# Patient Record
Sex: FEMALE | Race: WHITE | NOT HISPANIC OR LATINO | Employment: OTHER | URBAN - METROPOLITAN AREA
[De-identification: names, ages, dates, MRNs, and addresses within clinical notes are randomized per-mention and may not be internally consistent; named-entity substitution may affect disease eponyms.]

---

## 2017-01-04 ENCOUNTER — HOSPITAL ENCOUNTER (OUTPATIENT)
Dept: PHYSICAL THERAPY | Facility: HOSPITAL | Age: 67
Setting detail: RECURRING SERIES
Discharge: HOME | End: 2017-01-26
Attending: NURSE PRACTITIONER | Admitting: NURSE PRACTITIONER

## 2017-01-10 ENCOUNTER — HOSPITAL ENCOUNTER (OUTPATIENT)
Dept: CARDIOLOGY | Facility: HOSPITAL | Age: 67
Discharge: HOME | End: 2017-01-10
Attending: INTERNAL MEDICINE | Admitting: INTERNAL MEDICINE

## 2017-01-23 ENCOUNTER — HOSPITAL ENCOUNTER (OUTPATIENT)
Dept: CT IMAGING | Facility: HOSPITAL | Age: 67
Discharge: HOME | End: 2017-01-23
Attending: INTERNAL MEDICINE | Admitting: INTERNAL MEDICINE

## 2017-02-09 ENCOUNTER — HOSPITAL ENCOUNTER (OUTPATIENT)
Dept: CARDIOLOGY | Facility: HOSPITAL | Age: 67
Discharge: HOME | End: 2017-02-09
Attending: INTERNAL MEDICINE | Admitting: INTERNAL MEDICINE

## 2017-02-16 ENCOUNTER — HOSPITAL ENCOUNTER (OUTPATIENT)
Dept: OTHER | Facility: HOSPITAL | Age: 67
Discharge: HOME | End: 2017-02-16
Attending: INTERNAL MEDICINE | Admitting: INTERNAL MEDICINE

## 2017-02-16 LAB
ANION GAP SERPL CALC-SCNC: 13 MMOL/L (ref 10–20)
APTT BLD: 33.8 SEC (ref 24–31)
BASOPHILS # BLD AUTO: 0 10*3/UL (ref 0–0.2)
BASOPHILS NFR BLD AUTO: 1 % (ref 0–2)
BUN SERPL-MCNC: 11 MG/DL (ref 8–20)
BUN/CREAT SERPL: 12.2 (ref 5.4–26.2)
CALCIUM SERPL-MCNC: 9.3 MG/DL (ref 8.9–10.3)
CHLORIDE SERPL-SCNC: 108 MMOL/L (ref 101–111)
CHOLEST SERPL-MCNC: 163 MG/DL
CHOLEST/HDLC SERPL: 3.2 {RATIO}
CONV CO2: 26 MMOL/L (ref 22–32)
CONV LDL CHOLESTEROL DIRECT: 84 MG/DL (ref 0–100)
CREAT UR-MCNC: 0.9 MG/DL (ref 0.4–1)
DIFFERENTIAL METHOD BLD: (no result)
EOSINOPHIL # BLD AUTO: 0.3 10*3/UL (ref 0–0.3)
EOSINOPHIL # BLD AUTO: 6 % (ref 0–3)
ERYTHROCYTE [DISTWIDTH] IN BLOOD BY AUTOMATED COUNT: 14.4 % (ref 11.5–14.5)
GLUCOSE SERPL-MCNC: 98 MG/DL (ref 65–99)
HCT VFR BLD AUTO: 38.1 % (ref 35–49)
HDLC SERPL-MCNC: 51 MG/DL
HGB BLD-MCNC: 12.6 G/DL (ref 12–15)
INR PPP: 1.5
LDLC/HDLC SERPL: 1.6 {RATIO}
LIPID INTERPRETATION: ABNORMAL
LYMPHOCYTES # BLD AUTO: 1.8 10*3/UL (ref 0.8–4.8)
LYMPHOCYTES NFR BLD AUTO: 40 % (ref 18–42)
MCH RBC QN AUTO: 28.3 PG (ref 26–32)
MCHC RBC AUTO-ENTMCNC: 33.1 G/DL (ref 32–36)
MCV RBC AUTO: 85.5 FL (ref 80–94)
MONOCYTES # BLD AUTO: 0.5 10*3/UL (ref 0.1–1.3)
MONOCYTES NFR BLD AUTO: 11 % (ref 2–11)
NEUTROPHILS # BLD AUTO: 1.9 10*3/UL (ref 2.3–8.6)
NEUTROPHILS NFR BLD AUTO: 42 % (ref 50–75)
NRBC BLD AUTO-RTO: 0 /100{WBCS}
NRBC/RBC NFR BLD MANUAL: 0 10*3/UL
PLATELET # BLD AUTO: 209 10*3/UL (ref 150–450)
PMV BLD AUTO: 10.3 FL (ref 7.4–10.4)
POTASSIUM SERPL-SCNC: 4 MMOL/L (ref 3.6–5.1)
PROTHROMBIN TIME: 19.9 SEC (ref 9.6–11.7)
RBC # BLD AUTO: 4.45 10*6/UL (ref 4–5.4)
SODIUM SERPL-SCNC: 143 MMOL/L (ref 136–144)
TRIGL SERPL-MCNC: 125 MG/DL
VLDLC SERPL CALC-MCNC: 28.2 MG/DL
WBC # BLD AUTO: 4.5 10*3/UL (ref 4.5–11.5)

## 2018-07-17 ENCOUNTER — HOSPITAL ENCOUNTER (OUTPATIENT)
Dept: CARDIOLOGY | Facility: HOSPITAL | Age: 68
Discharge: HOME | End: 2018-07-17
Attending: INTERNAL MEDICINE | Admitting: INTERNAL MEDICINE

## 2019-02-14 ENCOUNTER — OFFICE (AMBULATORY)
Dept: URBAN - METROPOLITAN AREA CLINIC 64 | Facility: CLINIC | Age: 69
End: 2019-02-14

## 2019-02-14 VITALS
HEIGHT: 63 IN | DIASTOLIC BLOOD PRESSURE: 66 MMHG | WEIGHT: 162 LBS | SYSTOLIC BLOOD PRESSURE: 110 MMHG | HEART RATE: 78 BPM

## 2019-02-14 DIAGNOSIS — K62.89 OTHER SPECIFIED DISEASES OF ANUS AND RECTUM: ICD-10-CM

## 2019-02-14 DIAGNOSIS — K21.9 GASTRO-ESOPHAGEAL REFLUX DISEASE WITHOUT ESOPHAGITIS: ICD-10-CM

## 2019-02-14 DIAGNOSIS — R07.9 CHEST PAIN, UNSPECIFIED: ICD-10-CM

## 2019-02-14 DIAGNOSIS — K62.5 HEMORRHAGE OF ANUS AND RECTUM: ICD-10-CM

## 2019-02-14 PROCEDURE — 99214 OFFICE O/P EST MOD 30 MIN: CPT | Performed by: NURSE PRACTITIONER

## 2019-02-25 ENCOUNTER — HOSPITAL ENCOUNTER (OUTPATIENT)
Dept: CARDIOLOGY | Facility: HOSPITAL | Age: 69
Discharge: HOME | End: 2019-02-25
Attending: INTERNAL MEDICINE | Admitting: INTERNAL MEDICINE

## 2019-03-07 ENCOUNTER — ON CAMPUS - OUTPATIENT (AMBULATORY)
Dept: URBAN - METROPOLITAN AREA HOSPITAL 2 | Facility: HOSPITAL | Age: 69
End: 2019-03-07

## 2019-03-07 VITALS
SYSTOLIC BLOOD PRESSURE: 120 MMHG | HEART RATE: 74 BPM | RESPIRATION RATE: 16 BRPM | DIASTOLIC BLOOD PRESSURE: 61 MMHG | DIASTOLIC BLOOD PRESSURE: 70 MMHG | SYSTOLIC BLOOD PRESSURE: 110 MMHG | HEIGHT: 63 IN | DIASTOLIC BLOOD PRESSURE: 62 MMHG | OXYGEN SATURATION: 98 % | DIASTOLIC BLOOD PRESSURE: 75 MMHG | SYSTOLIC BLOOD PRESSURE: 127 MMHG | WEIGHT: 159 LBS | HEART RATE: 73 BPM | OXYGEN SATURATION: 97 % | TEMPERATURE: 96.9 F | RESPIRATION RATE: 20 BRPM | SYSTOLIC BLOOD PRESSURE: 114 MMHG | HEART RATE: 76 BPM | HEART RATE: 66 BPM | OXYGEN SATURATION: 100 % | DIASTOLIC BLOOD PRESSURE: 87 MMHG | RESPIRATION RATE: 14 BRPM | HEART RATE: 67 BPM | OXYGEN SATURATION: 94 % | SYSTOLIC BLOOD PRESSURE: 106 MMHG

## 2019-03-07 DIAGNOSIS — T18.108A UNSPECIFIED FOREIGN BODY IN ESOPHAGUS CAUSING OTHER INJURY,: ICD-10-CM

## 2019-03-07 DIAGNOSIS — K44.9 DIAPHRAGMATIC HERNIA WITHOUT OBSTRUCTION OR GANGRENE: ICD-10-CM

## 2019-03-07 DIAGNOSIS — Z79.01 LONG TERM (CURRENT) USE OF ANTICOAGULANTS: ICD-10-CM

## 2019-03-07 DIAGNOSIS — K22.4 DYSKINESIA OF ESOPHAGUS: ICD-10-CM

## 2019-03-07 DIAGNOSIS — R07.9 CHEST PAIN, UNSPECIFIED: ICD-10-CM

## 2019-03-07 DIAGNOSIS — K21.9 GASTRO-ESOPHAGEAL REFLUX DISEASE WITHOUT ESOPHAGITIS: ICD-10-CM

## 2019-03-07 DIAGNOSIS — Z48.815 ENCOUNTER FOR SURGICAL AFTERCARE FOLLOWING SURGERY ON THE DI: ICD-10-CM

## 2019-03-07 PROCEDURE — 43235 EGD DIAGNOSTIC BRUSH WASH: CPT | Performed by: INTERNAL MEDICINE

## 2019-04-12 ENCOUNTER — OFFICE (AMBULATORY)
Dept: URBAN - METROPOLITAN AREA CLINIC 64 | Facility: CLINIC | Age: 69
End: 2019-04-12

## 2019-04-12 VITALS
HEART RATE: 78 BPM | HEIGHT: 63 IN | SYSTOLIC BLOOD PRESSURE: 78 MMHG | DIASTOLIC BLOOD PRESSURE: 53 MMHG | WEIGHT: 163 LBS

## 2019-04-12 DIAGNOSIS — R13.10 DYSPHAGIA, UNSPECIFIED: ICD-10-CM

## 2019-04-12 DIAGNOSIS — K21.9 GASTRO-ESOPHAGEAL REFLUX DISEASE WITHOUT ESOPHAGITIS: ICD-10-CM

## 2019-04-12 DIAGNOSIS — K62.89 OTHER SPECIFIED DISEASES OF ANUS AND RECTUM: ICD-10-CM

## 2019-04-12 DIAGNOSIS — R07.9 CHEST PAIN, UNSPECIFIED: ICD-10-CM

## 2019-04-12 PROCEDURE — 99213 OFFICE O/P EST LOW 20 MIN: CPT | Performed by: NURSE PRACTITIONER

## 2019-04-12 RX ORDER — LIDOCAINE 5 G/100G
OINTMENT TOPICAL
Qty: 1 | Refills: 2 | Status: COMPLETED
Start: 2019-04-12 | End: 2020-08-12

## 2019-07-01 ENCOUNTER — TELEPHONE (OUTPATIENT)
Dept: CARDIOLOGY | Facility: CLINIC | Age: 69
End: 2019-07-01

## 2019-07-01 DIAGNOSIS — I48.20 CHRONIC ATRIAL FIBRILLATION (HCC): Primary | ICD-10-CM

## 2019-07-01 DIAGNOSIS — Z79.01 LONG TERM (CURRENT) USE OF ANTICOAGULANTS: ICD-10-CM

## 2019-07-01 DIAGNOSIS — I48.20 CHRONIC ATRIAL FIBRILLATION (HCC): ICD-10-CM

## 2019-07-01 RX ORDER — WARFARIN SODIUM 3 MG/1
3 TABLET ORAL EVERY 24 HOURS
Qty: 60 TABLET | Refills: 0 | Status: SHIPPED | OUTPATIENT
Start: 2019-07-01 | End: 2020-01-03 | Stop reason: SDUPTHER

## 2019-07-01 RX ORDER — WARFARIN SODIUM 3 MG/1
TABLET ORAL EVERY 24 HOURS
COMMUNITY
Start: 2018-11-14 | End: 2019-07-01 | Stop reason: SDUPTHER

## 2019-07-01 NOTE — TELEPHONE ENCOUNTER
Pt calling requesting warfarin refill  She only has 3 pills left.  Send to [facility]- [place], [place]   [address]

## 2019-07-22 ENCOUNTER — ANTICOAGULATION VISIT (OUTPATIENT)
Dept: CARDIOLOGY | Facility: CLINIC | Age: 69
End: 2019-07-22

## 2019-07-22 DIAGNOSIS — Z79.01 LONG TERM (CURRENT) USE OF ANTICOAGULANTS: ICD-10-CM

## 2019-07-22 DIAGNOSIS — I48.20 CHRONIC ATRIAL FIBRILLATION (HCC): ICD-10-CM

## 2019-07-22 PROBLEM — I48.91 ATRIAL FIBRILLATION (HCC): Status: ACTIVE | Noted: 2019-07-22

## 2019-07-22 LAB — INR PPP: 2.4 (ref 2–3)

## 2019-08-19 PROBLEM — R07.9 CHEST PAIN: Status: ACTIVE | Noted: 2017-02-01

## 2019-08-19 PROBLEM — E78.5 HYPERLIPIDEMIA: Status: ACTIVE | Noted: 2019-08-19

## 2019-08-19 PROBLEM — R94.39 ABNORMAL CARDIOVASCULAR STRESS TEST: Status: ACTIVE | Noted: 2017-02-14

## 2019-08-19 PROBLEM — I05.9 MITRAL VALVE DISEASE: Status: ACTIVE | Noted: 2017-11-15

## 2019-08-19 PROBLEM — I07.1 TI (TRICUSPID INCOMPETENCE): Status: ACTIVE | Noted: 2019-08-19

## 2019-08-19 PROBLEM — I27.20 HYPERTENSIVE PULMONARY VASCULAR DISEASE (HCC): Status: ACTIVE | Noted: 2019-08-19

## 2019-08-19 PROBLEM — I34.0 MI (MITRAL INCOMPETENCE): Status: ACTIVE | Noted: 2019-08-19

## 2019-08-19 PROBLEM — I48.91 ATRIAL FIBRILLATION (HCC): Status: ACTIVE | Noted: 2019-08-19

## 2019-08-19 PROBLEM — R00.2 PALPITATIONS: Status: ACTIVE | Noted: 2019-02-25

## 2019-08-19 PROBLEM — G47.30 SLEEP APNEA: Status: ACTIVE | Noted: 2019-08-19

## 2019-08-19 PROBLEM — E03.9 HYPOTHYROIDISM: Status: ACTIVE | Noted: 2019-08-19

## 2019-08-19 RX ORDER — METOPROLOL SUCCINATE 25 MG/1
TABLET, EXTENDED RELEASE ORAL
COMMUNITY
Start: 2013-07-25 | End: 2020-01-20

## 2019-08-19 RX ORDER — ONDANSETRON 4 MG/1
TABLET, FILM COATED ORAL EVERY 4 HOURS PRN
COMMUNITY
Start: 2013-05-24 | End: 2020-04-03

## 2019-08-19 RX ORDER — BUMETANIDE 1 MG/1
TABLET ORAL
COMMUNITY
Start: 2014-11-21 | End: 2020-01-03 | Stop reason: SDUPTHER

## 2019-08-19 RX ORDER — ROSUVASTATIN CALCIUM 5 MG/1
TABLET, COATED ORAL
COMMUNITY
Start: 2013-05-24 | End: 2020-03-25

## 2019-08-19 RX ORDER — ALPRAZOLAM 0.25 MG/1
TABLET ORAL 3 TIMES DAILY
COMMUNITY
Start: 2013-05-24 | End: 2019-09-05 | Stop reason: SDUPTHER

## 2019-08-19 RX ORDER — CHOLECALCIFEROL (VITAMIN D3) 25 MCG
1000 CAPSULE ORAL DAILY
COMMUNITY
Start: 2018-07-19

## 2019-08-19 RX ORDER — LEVOTHYROXINE SODIUM 137 UG/1
137 TABLET ORAL DAILY
COMMUNITY
Start: 2013-05-24

## 2019-08-19 RX ORDER — MULTIVIT WITH MINERALS/LUTEIN
1 TABLET ORAL DAILY
COMMUNITY
Start: 2018-07-19

## 2019-08-19 RX ORDER — SPIRONOLACTONE 25 MG/1
TABLET ORAL DAILY
COMMUNITY
Start: 2013-05-24 | End: 2020-04-03

## 2019-08-19 RX ORDER — OMEPRAZOLE 40 MG/1
40 CAPSULE, DELAYED RELEASE ORAL DAILY
COMMUNITY
Start: 2015-07-22

## 2019-08-19 RX ORDER — CETIRIZINE HYDROCHLORIDE 10 MG/1
10 TABLET ORAL DAILY
COMMUNITY
Start: 2014-11-21

## 2019-08-19 RX ORDER — ESCITALOPRAM OXALATE 20 MG/1
20 TABLET ORAL DAILY
COMMUNITY
Start: 2013-05-24

## 2019-08-19 RX ORDER — WARFARIN SODIUM 4 MG/1
TABLET ORAL
COMMUNITY
Start: 2013-05-24 | End: 2020-01-03 | Stop reason: SDUPTHER

## 2019-08-19 RX ORDER — SILDENAFIL CITRATE 20 MG/1
20 TABLET ORAL 3 TIMES DAILY
COMMUNITY
Start: 2013-05-24

## 2019-08-19 RX ORDER — AMIODARONE HYDROCHLORIDE 200 MG/1
TABLET ORAL 2 TIMES DAILY
COMMUNITY
Start: 2013-07-25 | End: 2020-04-03

## 2019-08-19 RX ORDER — OXYCODONE AND ACETAMINOPHEN 7.5; 325 MG/1; MG/1
TABLET ORAL
COMMUNITY
Start: 2017-02-01 | End: 2020-04-03

## 2019-09-04 ENCOUNTER — TELEPHONE (OUTPATIENT)
Dept: CARDIOLOGY | Facility: CLINIC | Age: 69
End: 2019-09-04

## 2019-09-04 DIAGNOSIS — Z79.01 LONG TERM (CURRENT) USE OF ANTICOAGULANTS: ICD-10-CM

## 2019-09-04 DIAGNOSIS — I48.20 CHRONIC ATRIAL FIBRILLATION (HCC): Primary | ICD-10-CM

## 2019-09-04 RX ORDER — WARFARIN SODIUM 3 MG/1
TABLET ORAL
Qty: 30 TABLET | Refills: 0 | Status: SHIPPED | OUTPATIENT
Start: 2019-09-04 | End: 2019-10-07 | Stop reason: SDUPTHER

## 2019-09-05 ENCOUNTER — ANTICOAGULATION VISIT (OUTPATIENT)
Dept: CARDIOLOGY | Facility: CLINIC | Age: 69
End: 2019-09-05

## 2019-09-05 ENCOUNTER — OFFICE VISIT (OUTPATIENT)
Dept: CARDIOLOGY | Facility: CLINIC | Age: 69
End: 2019-09-05

## 2019-09-05 VITALS
HEART RATE: 75 BPM | DIASTOLIC BLOOD PRESSURE: 70 MMHG | SYSTOLIC BLOOD PRESSURE: 114 MMHG | BODY MASS INDEX: 28.87 KG/M2 | WEIGHT: 163 LBS

## 2019-09-05 VITALS
DIASTOLIC BLOOD PRESSURE: 70 MMHG | HEART RATE: 75 BPM | HEIGHT: 63 IN | WEIGHT: 163 LBS | SYSTOLIC BLOOD PRESSURE: 114 MMHG | BODY MASS INDEX: 28.88 KG/M2

## 2019-09-05 DIAGNOSIS — I27.20 HYPERTENSIVE PULMONARY VASCULAR DISEASE (HCC): ICD-10-CM

## 2019-09-05 DIAGNOSIS — I27.0 PULMONARY HYPERTENSION, PRIMARY (HCC): ICD-10-CM

## 2019-09-05 DIAGNOSIS — I36.1 NON-RHEUMATIC TRICUSPID VALVE INSUFFICIENCY: ICD-10-CM

## 2019-09-05 DIAGNOSIS — E78.00 PURE HYPERCHOLESTEROLEMIA: Primary | ICD-10-CM

## 2019-09-05 DIAGNOSIS — I48.20 CHRONIC ATRIAL FIBRILLATION (HCC): ICD-10-CM

## 2019-09-05 DIAGNOSIS — Z79.01 LONG TERM (CURRENT) USE OF ANTICOAGULANTS: ICD-10-CM

## 2019-09-05 DIAGNOSIS — I34.0 NON-RHEUMATIC MITRAL REGURGITATION: ICD-10-CM

## 2019-09-05 LAB — INR PPP: 1.6 (ref 0.9–1.1)

## 2019-09-05 PROCEDURE — 36416 COLLJ CAPILLARY BLOOD SPEC: CPT | Performed by: INTERNAL MEDICINE

## 2019-09-05 PROCEDURE — 93000 ELECTROCARDIOGRAM COMPLETE: CPT | Performed by: INTERNAL MEDICINE

## 2019-09-05 PROCEDURE — 85610 PROTHROMBIN TIME: CPT | Performed by: INTERNAL MEDICINE

## 2019-09-05 PROCEDURE — 99213 OFFICE O/P EST LOW 20 MIN: CPT | Performed by: INTERNAL MEDICINE

## 2019-09-05 RX ORDER — ALPRAZOLAM 0.5 MG/1
0.5 TABLET ORAL 2 TIMES DAILY PRN
Refills: 0 | COMMUNITY
Start: 2019-08-07

## 2019-09-05 RX ORDER — LEVOTHYROXINE SODIUM 125 MCG
125 TABLET ORAL DAILY
Refills: 1 | COMMUNITY
Start: 2019-05-31 | End: 2020-04-03

## 2019-09-05 RX ORDER — FLUTICASONE PROPIONATE 50 MCG
1 SPRAY, SUSPENSION (ML) NASAL DAILY
COMMUNITY
End: 2020-07-06

## 2019-09-05 NOTE — PROGRESS NOTES
"    Subjective:     Encounter Date:09/05/2019      Patient ID: Aleena Benson is a 69 y.o. female.    Chief Complaint:  History of Present Illness 69-year-old white female with history of valvular heart disease status post mitral valve replacement and tricuspid valve repair history of atrial fibrillation hypertension hyperlipidemia and pulmonary hypertension presents to my office for follow-up.  Patient is currently stable without any symptoms of chest pain but has some shortness of breath with exertion.  No complaints of any PND orthopnea.  No palpitations dizziness syncope.  She has some swelling of the feet.  She is taking her medicines regularly.  She does not smoke.  She is trying to exercise regularly.  She needs one tooth extraction.    The following portions of the patient's history were reviewed and updated as appropriate: allergies, current medications, past family history, past medical history, past social history, past surgical history and problem list.  Past Medical History:   Diagnosis Date   • Atrial fibrillation (CMS/HCC)    • Hyperlipidemia    • Hypertension    • Sleep apnea    • Valvular disease      Past Surgical History:   Procedure Laterality Date   • CARDIAC CATHETERIZATION  2013 2015, 2017   • MITRAL VALVE REPLACEMENT  2013   • TRICUSPID VALVE REPLACEMENT  2013     /70   Pulse 75   Ht 160 cm (63\")   Wt 73.9 kg (163 lb)   BMI 28.87 kg/m²   Family History   Problem Relation Age of Onset   • Heart disease Father    • Heart disease Brother    • Heart disease Paternal Uncle        Current Outpatient Medications:   •  ALPRAZolam (XANAX) 0.5 MG tablet, TAKE 1 TABLET BY MOUTH TWICE DAILY AS NEEDED FOR ANXIETY MAX OF 1 MG, Disp: , Rfl: 0  •  aspirin 81 MG tablet, ASPIRIN 81 MG ORAL TABLET, Disp: , Rfl:   •  bumetanide (BUMEX) 1 MG tablet, BUMETANIDE 1 MG TABS, Disp: , Rfl:   •  Calcium Carb-Cholecalciferol 500-400 MG-UNIT chewable tablet, Chew., Disp: , Rfl:   •  cetirizine (ZYRTEC " ALLERGY) 10 MG tablet, ZYRTEC ALLERGY 10 MG TABS, Disp: , Rfl:   •  Cholecalciferol (VITAMIN D-3) 1000 units capsule, VITAMIN D-3 1000 UNIT CAPS, Disp: , Rfl:   •  Denosumab (PROLIA SC), PROLIA SOSY, Disp: , Rfl:   •  escitalopram (LEXAPRO) 20 MG tablet, Take  by mouth Daily., Disp: , Rfl:   •  levothyroxine (SYNTHROID) 137 MCG tablet, SYNTHROID 137 MCG TABS, Disp: , Rfl:   •  Macitentan (OPSUMIT) 10 MG tablet, OPSUMIT 10 MG TABS, Disp: , Rfl:   •  metoprolol succinate XL (TOPROL-XL) 25 MG 24 hr tablet, METOPROLOL SUCCINATE ER 25 MG XR24H-TAB, Disp: , Rfl:   •  Multiple Vitamins-Minerals (CENTRUM SILVER) tablet, CENTRUM SILVER TABS, Disp: , Rfl:   •  omeprazole (priLOSEC) 40 MG capsule, OMEPRAZOLE 40 MG CPDR, Disp: , Rfl:   •  rosuvastatin (CRESTOR) 5 MG tablet, CRESTOR 5 MG TABS, Disp: , Rfl:   •  sildenafil (REVATIO) 20 MG tablet, Take  by mouth 3 (Three) Times a Day., Disp: , Rfl:   •  warfarin (COUMADIN) 3 MG tablet, Take 1 tablet by mouth Daily. Take one tablet by mouth daily, or as directed, Disp: 60 tablet, Rfl: 0  •  warfarin (COUMADIN) 3 MG tablet, Take one tablet by mouth daily, or as directed.  Continue to have INR checked on a regular basis, Disp: 30 tablet, Rfl: 0  •  warfarin (COUMADIN) 4 MG tablet, Take  by mouth., Disp: , Rfl:   •  amiodarone (PACERONE) 200 MG tablet, Take  by mouth 2 (Two) Times a Day., Disp: , Rfl:   •  conjugated estrogens (PREMARIN) 0.625 MG/GM vaginal cream, Insert  into the vagina., Disp: , Rfl:   •  Cyanocobalamin (B-12 COMPLIANCE INJECTION) 1000 MCG/ML kit, B-12 COMPLIANCE INJECTION 1000 MCG/ML KIT, Disp: , Rfl:   •  fluticasone (FLONASE) 50 MCG/ACT nasal spray, 1 spray into the nostril(s) as directed by provider Daily., Disp: , Rfl:   •  metFORMIN (GLUCOPHAGE) 1000 MG tablet, Take  by mouth 2 (Two) Times a Day., Disp: , Rfl:   •  ondansetron (ZOFRAN) 4 MG tablet, Take  by mouth Every 4 (Four) Hours As Needed., Disp: , Rfl:   •  oxyCODONE-acetaminophen (PERCOCET) 7.5-325 MG  per tablet, OXYCODONE-ACETAMINOPHEN 7.5-325 MG TABS, Disp: , Rfl:   •  spironolactone (ALDACTONE) 25 MG tablet, Take  by mouth Daily., Disp: , Rfl:   •  SYNTHROID 125 MCG tablet, Take 125 mcg by mouth Daily., Disp: , Rfl: 1  Allergies   Allergen Reactions   • Adhesive Tape Unknown (See Comments)   • Codeine Unknown (See Comments)     Social History     Socioeconomic History   • Marital status:      Spouse name: Not on file   • Number of children: Not on file   • Years of education: Not on file   • Highest education level: Not on file   Tobacco Use   • Smoking status: Never Smoker   • Smokeless tobacco: Never Used     Review of Systems   Constitution: Positive for malaise/fatigue. Negative for fever.   Cardiovascular: Positive for leg swelling. Negative for chest pain, dyspnea on exertion and palpitations.   Respiratory: Positive for shortness of breath. Negative for cough.    Skin: Negative for rash.   Gastrointestinal: Negative for abdominal pain, nausea and vomiting.   Neurological: Negative for focal weakness, headaches, light-headedness and numbness.   All other systems reviewed and are negative.             Objective:     Physical Exam   Constitutional: She appears well-developed and well-nourished.   HENT:   Head: Normocephalic and atraumatic.   Eyes: Conjunctivae are normal. No scleral icterus.   Neck: Normal range of motion. Neck supple. No JVD present. Carotid bruit is not present.   Cardiovascular: Normal rate, regular rhythm, S1 normal, S2 normal, normal heart sounds and intact distal pulses. PMI is not displaced.   Pulmonary/Chest: Effort normal and breath sounds normal. She has no wheezes. She has no rales.   Abdominal: Soft. Bowel sounds are normal.   Neurological: She is alert. She has normal strength.   Skin: Skin is warm and dry. No rash noted.     Procedures    Lab Review:       Assessment:          Diagnosis Plan   1. Pure hypercholesterolemia     2. Hypertensive pulmonary vascular disease  (CMS/[facility])     3. Non-rheumatic tricuspid valve insufficiency     4. Non-rheumatic mitral regurgitation     5. Chronic atrial fibrillation (CMS/[facility])     6. Pulmonary hypertension, primary (CMS/[facility])            Plan:       Patient had significant mitral regurgitation and is status post mitral valve replacement with a bioprosthetic valve patient had a #29 [employer] biological process  Patient also had tricuspid valve repair and is currently stable.  Patient needs a tooth extraction and can hold warfarin for 1 day 2 days.  Patient has atrial fibrillation is currently stable on medications per  Patient's blood pressure and heart rate are stable  Patient has pulmonary hypertension is followed by pulmonologist

## 2019-09-12 ENCOUNTER — TRANSCRIBE ORDERS (OUTPATIENT)
Dept: ADMINISTRATIVE | Facility: HOSPITAL | Age: 69
End: 2019-09-12

## 2019-09-12 ENCOUNTER — TELEPHONE (OUTPATIENT)
Dept: CARDIOLOGY | Facility: CLINIC | Age: 69
End: 2019-09-12

## 2019-09-12 ENCOUNTER — HOSPITAL ENCOUNTER (OUTPATIENT)
Dept: CARDIOLOGY | Facility: HOSPITAL | Age: 69
Discharge: HOME | End: 2019-09-12
Admitting: ORTHOPAEDIC SURGERY

## 2019-09-12 ENCOUNTER — LAB (OUTPATIENT)
Dept: LAB | Facility: HOSPITAL | Age: 69
End: 2019-09-12

## 2019-09-12 ENCOUNTER — ANTICOAGULATION VISIT (OUTPATIENT)
Dept: CARDIOLOGY | Facility: CLINIC | Age: 69
End: 2019-09-12

## 2019-09-12 DIAGNOSIS — Z79.01 LONG TERM (CURRENT) USE OF ANTICOAGULANTS: ICD-10-CM

## 2019-09-12 DIAGNOSIS — Z01.818 PREOP TESTING: Primary | ICD-10-CM

## 2019-09-12 DIAGNOSIS — Z01.818 PREOP TESTING: ICD-10-CM

## 2019-09-12 DIAGNOSIS — I48.20 CHRONIC ATRIAL FIBRILLATION (HCC): ICD-10-CM

## 2019-09-12 LAB
ANION GAP SERPL CALCULATED.3IONS-SCNC: 11.1 MMOL/L (ref 5–15)
BASOPHILS # BLD AUTO: 0.1 10*3/MM3 (ref 0–0.2)
BASOPHILS NFR BLD AUTO: 1 % (ref 0–1.5)
BUN BLD-MCNC: 14 MG/DL (ref 8–20)
BUN/CREAT SERPL: 15.6 (ref 5.4–26.2)
CALCIUM SPEC-SCNC: 8.3 MG/DL (ref 8.9–10.3)
CHLORIDE SERPL-SCNC: 107 MMOL/L (ref 101–111)
CO2 SERPL-SCNC: 26 MMOL/L (ref 22–32)
CREAT BLD-MCNC: 0.9 MG/DL (ref 0.4–1)
DEPRECATED RDW RBC AUTO: 44.6 FL (ref 37–54)
EOSINOPHIL # BLD AUTO: 0.3 10*3/MM3 (ref 0–0.4)
EOSINOPHIL NFR BLD AUTO: 5.7 % (ref 0.3–6.2)
ERYTHROCYTE [DISTWIDTH] IN BLOOD BY AUTOMATED COUNT: 14.8 % (ref 12.3–15.4)
GFR SERPL CREATININE-BSD FRML MDRD: 62 ML/MIN/1.73
GLUCOSE BLD-MCNC: 99 MG/DL (ref 65–99)
HCT VFR BLD AUTO: 33 % (ref 34–46.6)
HGB BLD-MCNC: 10.9 G/DL (ref 12–15.9)
INR PPP: 1.31 (ref 2–3)
LYMPHOCYTES # BLD AUTO: 2.2 10*3/MM3 (ref 0.7–3.1)
LYMPHOCYTES NFR BLD AUTO: 41.4 % (ref 19.6–45.3)
MCH RBC QN AUTO: 28.1 PG (ref 26.6–33)
MCHC RBC AUTO-ENTMCNC: 32.9 G/DL (ref 31.5–35.7)
MCV RBC AUTO: 85.1 FL (ref 79–97)
MONOCYTES # BLD AUTO: 0.8 10*3/MM3 (ref 0.1–0.9)
MONOCYTES NFR BLD AUTO: 14 % (ref 5–12)
NEUTROPHILS # BLD AUTO: 2 10*3/MM3 (ref 1.7–7)
NEUTROPHILS NFR BLD AUTO: 37.9 % (ref 42.7–76)
NRBC BLD AUTO-RTO: 0.1 /100 WBC (ref 0–0.2)
PLATELET # BLD AUTO: 232 10*3/MM3 (ref 140–450)
PMV BLD AUTO: 9.6 FL (ref 6–12)
POTASSIUM BLD-SCNC: 4.1 MMOL/L (ref 3.6–5.1)
PROTHROMBIN TIME: 13 SECONDS (ref 19.4–28.5)
RBC # BLD AUTO: 3.88 10*6/MM3 (ref 3.77–5.28)
SODIUM BLD-SCNC: 140 MMOL/L (ref 136–144)
WBC NRBC COR # BLD: 5.4 10*3/MM3 (ref 3.4–10.8)

## 2019-09-12 PROCEDURE — 93005 ELECTROCARDIOGRAM TRACING: CPT | Performed by: ORTHOPAEDIC SURGERY

## 2019-09-12 PROCEDURE — 36415 COLL VENOUS BLD VENIPUNCTURE: CPT

## 2019-09-12 PROCEDURE — 85025 COMPLETE CBC W/AUTO DIFF WBC: CPT

## 2019-09-12 PROCEDURE — 80048 BASIC METABOLIC PNL TOTAL CA: CPT

## 2019-09-12 PROCEDURE — 85610 PROTHROMBIN TIME: CPT

## 2019-09-12 NOTE — TELEPHONE ENCOUNTER
Patient surgery tomorrow with Dr. Rodgers 9/13/19   Called spoke with Patient advised dosing instructions for Lovenox. RX sent appt scheduled for INR check on Monday due to patient conflict with schedule on Tuesday.

## 2019-09-12 NOTE — TELEPHONE ENCOUNTER
Patient surgery tomorrow with Dr. Rodgers 9/13/19 Patient advised dosing instructions for Lovenox. RX sent appt scheduled for INR check on Monday due to patient conflict with schedule on Tuesday.

## 2019-09-12 NOTE — TELEPHONE ENCOUNTER
INR today was out of range at 1.31 called patient about INR result and discovered she has held coumadin per Dr. Rodgers's instructions.Patient was advised to stop Coumadin by Dr. Rodgers 5 days prior to trigger finger surgery. Surgery is scheduled for tomorrow 9/13/19 patient's last dose of Coumadin was 9/8/19.     A request for cardiac clearance has not been requested at this time for tomorrows surgery.The patient is now concerned that she should not have held medication for the last 5 days.    Patient was seen in the office Thursday 9/5/19 with no mention of this surgery. The patient's weight on that date of service was 73.9 Kg.     Verbally notified Dr. Day of the above.      Could you please advise on Lovenox Thanks

## 2019-09-12 NOTE — TELEPHONE ENCOUNTER
Patient should take Lovenox 1 mg/kg subcu twice daily after her hand surgery along with warfarin and continue until INR is more than 2.0

## 2019-09-13 ENCOUNTER — TELEPHONE (OUTPATIENT)
Dept: CARDIOLOGY | Facility: CLINIC | Age: 69
End: 2019-09-13

## 2019-09-13 NOTE — TELEPHONE ENCOUNTER
Advised patient she agreed risk and benefits were discussed if Lovenox is not bridged. Patient will  RX today

## 2019-09-13 NOTE — TELEPHONE ENCOUNTER
Patient's insurance will not pay for the 70mg Lovenox twice daily. The patient called and stated that [facility] told her they would only pay for 1 time daily.     Please advise on dosing surgery was today Thanks

## 2019-09-13 NOTE — TELEPHONE ENCOUNTER
Lovenox was ordered 1 injection daily qty 8 to insure patients compliance. Advised patient she needed to take 2 injections daily. She states she cannot afford the $115 each per syringe. Per Pharmacist at [facility] [address] Insurance will not cover any of the medication as it is written for twice daily. Pharmacist suggest prescribing 1 daily qty 8. RX was changed and  Medication was approved advised patient. Patient educated about how medication should be used and the risk of stroke if medication is not properly taken.

## 2019-09-16 ENCOUNTER — ANTICOAGULATION VISIT (OUTPATIENT)
Dept: CARDIOLOGY | Facility: CLINIC | Age: 69
End: 2019-09-16

## 2019-09-16 DIAGNOSIS — Z79.01 LONG TERM (CURRENT) USE OF ANTICOAGULANTS: ICD-10-CM

## 2019-09-16 DIAGNOSIS — I48.20 CHRONIC ATRIAL FIBRILLATION (HCC): ICD-10-CM

## 2019-09-16 LAB — INR PPP: 1.6 (ref 0.9–1.1)

## 2019-09-16 PROCEDURE — 85610 PROTHROMBIN TIME: CPT | Performed by: INTERNAL MEDICINE

## 2019-09-16 PROCEDURE — 36416 COLLJ CAPILLARY BLOOD SPEC: CPT | Performed by: INTERNAL MEDICINE

## 2019-09-18 ENCOUNTER — ANTICOAGULATION VISIT (OUTPATIENT)
Dept: CARDIOLOGY | Facility: CLINIC | Age: 69
End: 2019-09-18

## 2019-09-18 VITALS
DIASTOLIC BLOOD PRESSURE: 66 MMHG | HEART RATE: 79 BPM | WEIGHT: 160.75 LBS | BODY MASS INDEX: 28.48 KG/M2 | SYSTOLIC BLOOD PRESSURE: 115 MMHG

## 2019-09-18 VITALS
DIASTOLIC BLOOD PRESSURE: 69 MMHG | SYSTOLIC BLOOD PRESSURE: 118 MMHG | HEART RATE: 67 BPM | WEIGHT: 160.75 LBS | BODY MASS INDEX: 28.48 KG/M2

## 2019-09-18 DIAGNOSIS — I27.0 PULMONARY HYPERTENSION, PRIMARY (HCC): ICD-10-CM

## 2019-09-18 DIAGNOSIS — I48.20 CHRONIC ATRIAL FIBRILLATION (HCC): ICD-10-CM

## 2019-09-18 DIAGNOSIS — Z95.3 HISTORY OF MITRAL VALVE REPLACEMENT WITH BIOPROSTHETIC VALVE: ICD-10-CM

## 2019-09-18 DIAGNOSIS — Z79.01 LONG TERM (CURRENT) USE OF ANTICOAGULANTS: ICD-10-CM

## 2019-09-18 LAB — INR PPP: 2.1 (ref 0.9–1.1)

## 2019-09-18 PROCEDURE — 36416 COLLJ CAPILLARY BLOOD SPEC: CPT | Performed by: INTERNAL MEDICINE

## 2019-09-18 PROCEDURE — 85610 PROTHROMBIN TIME: CPT | Performed by: INTERNAL MEDICINE

## 2019-10-06 PROCEDURE — 93010 ELECTROCARDIOGRAM REPORT: CPT | Performed by: INTERNAL MEDICINE

## 2019-10-07 ENCOUNTER — ANTICOAGULATION VISIT (OUTPATIENT)
Dept: CARDIOLOGY | Facility: CLINIC | Age: 69
End: 2019-10-07

## 2019-10-07 DIAGNOSIS — I27.0 PULMONARY HYPERTENSION, PRIMARY (HCC): ICD-10-CM

## 2019-10-07 DIAGNOSIS — Z95.3 HISTORY OF MITRAL VALVE REPLACEMENT WITH BIOPROSTHETIC VALVE: ICD-10-CM

## 2019-10-07 DIAGNOSIS — I48.91 ATRIAL FIBRILLATION, UNSPECIFIED TYPE (HCC): ICD-10-CM

## 2019-10-07 DIAGNOSIS — Z79.01 LONG TERM (CURRENT) USE OF ANTICOAGULANTS: ICD-10-CM

## 2019-10-07 DIAGNOSIS — I48.20 CHRONIC ATRIAL FIBRILLATION (HCC): ICD-10-CM

## 2019-10-07 LAB — INR PPP: 1.2

## 2019-10-07 RX ORDER — WARFARIN SODIUM 3 MG/1
TABLET ORAL
Qty: 90 TABLET | Refills: 0 | Status: SHIPPED | OUTPATIENT
Start: 2019-10-07 | End: 2020-01-03 | Stop reason: SDUPTHER

## 2019-10-11 ENCOUNTER — ANTICOAGULATION VISIT (OUTPATIENT)
Dept: CARDIOLOGY | Facility: CLINIC | Age: 69
End: 2019-10-11

## 2019-10-11 VITALS
DIASTOLIC BLOOD PRESSURE: 70 MMHG | WEIGHT: 162.25 LBS | BODY MASS INDEX: 28.74 KG/M2 | SYSTOLIC BLOOD PRESSURE: 126 MMHG | HEART RATE: 66 BPM

## 2019-10-11 DIAGNOSIS — I27.0 PULMONARY HYPERTENSION, PRIMARY (HCC): ICD-10-CM

## 2019-10-11 DIAGNOSIS — I48.91 ATRIAL FIBRILLATION, UNSPECIFIED TYPE (HCC): ICD-10-CM

## 2019-10-11 DIAGNOSIS — Z79.01 LONG TERM (CURRENT) USE OF ANTICOAGULANTS: ICD-10-CM

## 2019-10-11 DIAGNOSIS — Z95.3 HISTORY OF MITRAL VALVE REPLACEMENT WITH BIOPROSTHETIC VALVE: ICD-10-CM

## 2019-10-11 LAB — INR PPP: 1.2 (ref 0.9–1.1)

## 2019-10-11 PROCEDURE — 85610 PROTHROMBIN TIME: CPT | Performed by: INTERNAL MEDICINE

## 2019-10-11 PROCEDURE — 36416 COLLJ CAPILLARY BLOOD SPEC: CPT | Performed by: INTERNAL MEDICINE

## 2019-10-11 NOTE — TELEPHONE ENCOUNTER
Pt onsite earlier today for pt/inr 1.2 had molar extraction T[place]ftyy restarted warfarin Wednesday.   Reviewing notes pt did not mention lovenox bridging.  Return call to pt to verify.  She did not want to mention. Reinforced importance of bridging.  Spoke with dr chavez : start lovenox 1mg /kg BID til inr 2.0.    Return call to pt with orders as above. Will [facility] to [facility] [address] 1 inj daily for 6 doses, but pt advised to take inj bid (due to insurance coverage. She stated she did not want to start med today.  Reinforced importance of bridging. appt scheduled for pt/inr recheck Monday 10/14/19 at 2pm

## 2019-10-11 NOTE — TELEPHONE ENCOUNTER
erx sent and call made to [facility] to verify lovenox 70mg dosing for insurance as daily dosage (pt instructed to take BID).

## 2019-10-14 ENCOUNTER — ANTICOAGULATION VISIT (OUTPATIENT)
Dept: CARDIOLOGY | Facility: CLINIC | Age: 69
End: 2019-10-14

## 2019-10-14 VITALS
WEIGHT: 162.25 LBS | BODY MASS INDEX: 28.74 KG/M2 | DIASTOLIC BLOOD PRESSURE: 70 MMHG | HEART RATE: 75 BPM | SYSTOLIC BLOOD PRESSURE: 113 MMHG

## 2019-10-14 DIAGNOSIS — Z79.01 LONG TERM (CURRENT) USE OF ANTICOAGULANTS: ICD-10-CM

## 2019-10-14 DIAGNOSIS — I48.91 ATRIAL FIBRILLATION, UNSPECIFIED TYPE (HCC): ICD-10-CM

## 2019-10-14 DIAGNOSIS — Z95.3 HISTORY OF MITRAL VALVE REPLACEMENT WITH BIOPROSTHETIC VALVE: ICD-10-CM

## 2019-10-14 DIAGNOSIS — I27.0 PULMONARY HYPERTENSION, PRIMARY (HCC): ICD-10-CM

## 2019-10-14 LAB — INR PPP: 2.2 (ref 0.9–1.1)

## 2019-10-14 PROCEDURE — 85610 PROTHROMBIN TIME: CPT | Performed by: INTERNAL MEDICINE

## 2019-10-14 PROCEDURE — 36416 COLLJ CAPILLARY BLOOD SPEC: CPT | Performed by: INTERNAL MEDICINE

## 2019-10-25 ENCOUNTER — ANTICOAGULATION VISIT (OUTPATIENT)
Dept: CARDIOLOGY | Facility: CLINIC | Age: 69
End: 2019-10-25

## 2019-10-25 VITALS
SYSTOLIC BLOOD PRESSURE: 123 MMHG | HEART RATE: 69 BPM | WEIGHT: 161.75 LBS | BODY MASS INDEX: 28.65 KG/M2 | DIASTOLIC BLOOD PRESSURE: 70 MMHG

## 2019-10-25 DIAGNOSIS — Z95.3 HISTORY OF MITRAL VALVE REPLACEMENT WITH BIOPROSTHETIC VALVE: ICD-10-CM

## 2019-10-25 DIAGNOSIS — I48.91 ATRIAL FIBRILLATION, UNSPECIFIED TYPE (HCC): ICD-10-CM

## 2019-10-25 DIAGNOSIS — I27.0 PULMONARY HYPERTENSION, PRIMARY (HCC): ICD-10-CM

## 2019-10-25 DIAGNOSIS — Z79.01 LONG TERM (CURRENT) USE OF ANTICOAGULANTS: ICD-10-CM

## 2019-10-25 LAB — INR PPP: 2.3 (ref 0.9–1.1)

## 2019-10-25 PROCEDURE — 85610 PROTHROMBIN TIME: CPT | Performed by: INTERNAL MEDICINE

## 2019-10-25 PROCEDURE — 36416 COLLJ CAPILLARY BLOOD SPEC: CPT | Performed by: INTERNAL MEDICINE

## 2019-11-22 ENCOUNTER — ANTICOAGULATION VISIT (OUTPATIENT)
Dept: CARDIOLOGY | Facility: CLINIC | Age: 69
End: 2019-11-22

## 2019-11-22 VITALS
SYSTOLIC BLOOD PRESSURE: 115 MMHG | WEIGHT: 162.5 LBS | HEART RATE: 72 BPM | DIASTOLIC BLOOD PRESSURE: 68 MMHG | BODY MASS INDEX: 28.79 KG/M2

## 2019-11-22 DIAGNOSIS — Z95.3 HISTORY OF MITRAL VALVE REPLACEMENT WITH BIOPROSTHETIC VALVE: ICD-10-CM

## 2019-11-22 DIAGNOSIS — I48.91 ATRIAL FIBRILLATION, UNSPECIFIED TYPE (HCC): ICD-10-CM

## 2019-11-22 DIAGNOSIS — Z79.01 LONG TERM (CURRENT) USE OF ANTICOAGULANTS: ICD-10-CM

## 2019-11-22 DIAGNOSIS — I27.0 PULMONARY HYPERTENSION, PRIMARY (HCC): ICD-10-CM

## 2019-11-22 LAB — INR PPP: 1.8 (ref 0.9–1.1)

## 2019-11-22 PROCEDURE — 85610 PROTHROMBIN TIME: CPT | Performed by: INTERNAL MEDICINE

## 2019-11-22 PROCEDURE — 36416 COLLJ CAPILLARY BLOOD SPEC: CPT | Performed by: INTERNAL MEDICINE

## 2019-12-20 ENCOUNTER — ANTICOAGULATION VISIT (OUTPATIENT)
Dept: CARDIOLOGY | Facility: CLINIC | Age: 69
End: 2019-12-20

## 2019-12-20 VITALS
BODY MASS INDEX: 28.96 KG/M2 | SYSTOLIC BLOOD PRESSURE: 112 MMHG | HEART RATE: 73 BPM | DIASTOLIC BLOOD PRESSURE: 72 MMHG | WEIGHT: 163.5 LBS

## 2019-12-20 DIAGNOSIS — Z95.3 HISTORY OF MITRAL VALVE REPLACEMENT WITH BIOPROSTHETIC VALVE: ICD-10-CM

## 2019-12-20 DIAGNOSIS — Z79.01 LONG TERM (CURRENT) USE OF ANTICOAGULANTS: ICD-10-CM

## 2019-12-20 DIAGNOSIS — I27.0 PULMONARY HYPERTENSION, PRIMARY (HCC): ICD-10-CM

## 2019-12-20 DIAGNOSIS — I48.91 ATRIAL FIBRILLATION, UNSPECIFIED TYPE (HCC): ICD-10-CM

## 2019-12-20 LAB — INR PPP: 2.1 (ref 0.9–1.1)

## 2019-12-20 PROCEDURE — 36416 COLLJ CAPILLARY BLOOD SPEC: CPT | Performed by: INTERNAL MEDICINE

## 2019-12-20 PROCEDURE — 85610 PROTHROMBIN TIME: CPT | Performed by: INTERNAL MEDICINE

## 2020-01-03 ENCOUNTER — TELEPHONE (OUTPATIENT)
Dept: CARDIOLOGY | Facility: CLINIC | Age: 70
End: 2020-01-03

## 2020-01-03 DIAGNOSIS — Z79.01 LONG TERM (CURRENT) USE OF ANTICOAGULANTS: ICD-10-CM

## 2020-01-03 DIAGNOSIS — I48.20 CHRONIC ATRIAL FIBRILLATION (HCC): ICD-10-CM

## 2020-01-03 RX ORDER — WARFARIN SODIUM 3 MG/1
TABLET ORAL
Qty: 90 TABLET | Refills: 0 | Status: SHIPPED | OUTPATIENT
Start: 2020-01-03 | End: 2020-03-25

## 2020-01-03 RX ORDER — WARFARIN SODIUM 3 MG/1
TABLET ORAL
Qty: 90 TABLET | Refills: 0 | Status: SHIPPED | OUTPATIENT
Start: 2020-01-03 | End: 2020-01-03 | Stop reason: SDUPTHER

## 2020-01-03 RX ORDER — BUMETANIDE 1 MG/1
1 TABLET ORAL DAILY
Qty: 90 TABLET | Refills: 0 | Status: SHIPPED | OUTPATIENT
Start: 2020-01-03

## 2020-01-03 NOTE — TELEPHONE ENCOUNTER
[facility], [address]  1,  Warfarin, does not know strength  Out of med, has enough for 1 day  2.  Bumex, 1 mg, takes 1 daily, 90 day supply with refills

## 2020-01-03 NOTE — TELEPHONE ENCOUNTER
Original RX sent in error to patient alternative pharmacy in [place] called cancelled request apLPN

## 2020-01-20 RX ORDER — METOPROLOL SUCCINATE 25 MG/1
TABLET, EXTENDED RELEASE ORAL
Qty: 90 TABLET | Refills: 0 | Status: SHIPPED | OUTPATIENT
Start: 2020-01-20 | End: 2020-04-03

## 2020-01-27 ENCOUNTER — TRANSCRIBE ORDERS (OUTPATIENT)
Dept: CARDIOLOGY | Facility: HOSPITAL | Age: 70
End: 2020-01-27

## 2020-01-27 DIAGNOSIS — I27.20 PULMONARY HTN (HCC): Primary | ICD-10-CM

## 2020-01-28 ENCOUNTER — HOSPITAL ENCOUNTER (OUTPATIENT)
Dept: CARDIOLOGY | Facility: HOSPITAL | Age: 70
Discharge: HOME | End: 2020-01-28
Admitting: INTERNAL MEDICINE

## 2020-01-28 ENCOUNTER — ANTICOAGULATION VISIT (OUTPATIENT)
Dept: CARDIOLOGY | Facility: CLINIC | Age: 70
End: 2020-01-28

## 2020-01-28 ENCOUNTER — TELEPHONE (OUTPATIENT)
Dept: CARDIOLOGY | Facility: CLINIC | Age: 70
End: 2020-01-28

## 2020-01-28 VITALS
HEIGHT: 63 IN | SYSTOLIC BLOOD PRESSURE: 113 MMHG | WEIGHT: 160 LBS | DIASTOLIC BLOOD PRESSURE: 61 MMHG | BODY MASS INDEX: 28.35 KG/M2

## 2020-01-28 VITALS
WEIGHT: 162 LBS | SYSTOLIC BLOOD PRESSURE: 144 MMHG | BODY MASS INDEX: 28.7 KG/M2 | HEART RATE: 67 BPM | DIASTOLIC BLOOD PRESSURE: 68 MMHG

## 2020-01-28 DIAGNOSIS — I48.91 ATRIAL FIBRILLATION, UNSPECIFIED TYPE (HCC): ICD-10-CM

## 2020-01-28 DIAGNOSIS — Z95.3 HISTORY OF MITRAL VALVE REPLACEMENT WITH BIOPROSTHETIC VALVE: ICD-10-CM

## 2020-01-28 DIAGNOSIS — I27.20 PULMONARY HTN (HCC): ICD-10-CM

## 2020-01-28 DIAGNOSIS — I27.0 PULMONARY HYPERTENSION, PRIMARY (HCC): ICD-10-CM

## 2020-01-28 DIAGNOSIS — Z79.01 LONG TERM (CURRENT) USE OF ANTICOAGULANTS: ICD-10-CM

## 2020-01-28 LAB
BH CV ECHO MEAS - ACS: 1.1 CM
BH CV ECHO MEAS - AI DEC SLOPE: 170.2 CM/SEC^2
BH CV ECHO MEAS - AI DEC TIME: 2.2 SEC
BH CV ECHO MEAS - AI MAX PG: 49.9 MMHG
BH CV ECHO MEAS - AI MAX VEL: 353.1 CM/SEC
BH CV ECHO MEAS - AI P1/2T: 607.6 MSEC
BH CV ECHO MEAS - AO MAX PG (FULL): 26.7 MMHG
BH CV ECHO MEAS - AO MAX PG: 33.5 MMHG
BH CV ECHO MEAS - AO MEAN PG (FULL): 15.8 MMHG
BH CV ECHO MEAS - AO MEAN PG: 19.4 MMHG
BH CV ECHO MEAS - AO ROOT AREA: 4 CM^2
BH CV ECHO MEAS - AO ROOT DIAM: 2.2 CM
BH CV ECHO MEAS - AO V2 MAX: 289.2 CM/SEC
BH CV ECHO MEAS - AO V2 MEAN: 211.8 CM/SEC
BH CV ECHO MEAS - AO V2 VTI: 67.9 CM
BH CV ECHO MEAS - AVA(I,A): 0.62 CM^2
BH CV ECHO MEAS - AVA(I,D): 0.62 CM^2
BH CV ECHO MEAS - AVA(V,A): 0.61 CM^2
BH CV ECHO MEAS - AVA(V,D): 0.61 CM^2
BH CV ECHO MEAS - EDV(CUBED): 138 ML
BH CV ECHO MEAS - EDV(MOD-SP4): 80 ML
BH CV ECHO MEAS - EDV(TEICH): 127.6 ML
BH CV ECHO MEAS - EF(CUBED): 82.3 %
BH CV ECHO MEAS - EF(MOD-SP4): 54.7 %
BH CV ECHO MEAS - EF(TEICH): 74.7 %
BH CV ECHO MEAS - ESV(CUBED): 24.5 ML
BH CV ECHO MEAS - ESV(MOD-SP4): 36.2 ML
BH CV ECHO MEAS - ESV(TEICH): 32.3 ML
BH CV ECHO MEAS - FS: 43.8 %
BH CV ECHO MEAS - IVS/LVPW: 0.85
BH CV ECHO MEAS - IVSD: 0.66 CM
BH CV ECHO MEAS - LA DIMENSION: 5 CM
BH CV ECHO MEAS - LA/AO: 2.2
BH CV ECHO MEAS - LV MASS(C)D: 125.4 GRAMS
BH CV ECHO MEAS - LV MAX PG: 6.7 MMHG
BH CV ECHO MEAS - LV MEAN PG: 3.6 MMHG
BH CV ECHO MEAS - LV V1 MAX: 129.9 CM/SEC
BH CV ECHO MEAS - LV V1 MEAN: 89.7 CM/SEC
BH CV ECHO MEAS - LV V1 VTI: 30.8 CM
BH CV ECHO MEAS - LVIDD: 5.2 CM
BH CV ECHO MEAS - LVIDS: 2.9 CM
BH CV ECHO MEAS - LVOT AREA: 1.4 CM^2
BH CV ECHO MEAS - LVOT DIAM: 1.3 CM
BH CV ECHO MEAS - LVPWD: 0.78 CM
BH CV ECHO MEAS - MV A MAX VEL: 67.5 CM/SEC
BH CV ECHO MEAS - MV DEC SLOPE: 643.6 CM/SEC^2
BH CV ECHO MEAS - MV DEC TIME: 0.28 SEC
BH CV ECHO MEAS - MV E MAX VEL: 183.4 CM/SEC
BH CV ECHO MEAS - MV E/A: 2.7
BH CV ECHO MEAS - MV MAX PG: 15.7 MMHG
BH CV ECHO MEAS - MV MEAN PG: 5.6 MMHG
BH CV ECHO MEAS - MV V2 MAX: 198.2 CM/SEC
BH CV ECHO MEAS - MV V2 MEAN: 111.4 CM/SEC
BH CV ECHO MEAS - MV V2 VTI: 45 CM
BH CV ECHO MEAS - MVA(VTI): 0.93 CM^2
BH CV ECHO MEAS - PULM SYS VEL: 72.2 CM/SEC
BH CV ECHO MEAS - RAP SYSTOLE: 3 MMHG
BH CV ECHO MEAS - RVDD: 2.5 CM
BH CV ECHO MEAS - RVSP: 27 MMHG
BH CV ECHO MEAS - SV(AO): 269.7 ML
BH CV ECHO MEAS - SV(CUBED): 113.5 ML
BH CV ECHO MEAS - SV(LVOT): 42 ML
BH CV ECHO MEAS - SV(MOD-SP4): 43.8 ML
BH CV ECHO MEAS - SV(TEICH): 95.3 ML
BH CV ECHO MEAS - TR MAX VEL: 244 CM/SEC
INR PPP: 1.9 (ref 0.9–1.1)
MAXIMAL PREDICTED HEART RATE: 150 BPM
STRESS TARGET HR: 128 BPM

## 2020-01-28 PROCEDURE — 93306 TTE W/DOPPLER COMPLETE: CPT

## 2020-01-28 PROCEDURE — 36416 COLLJ CAPILLARY BLOOD SPEC: CPT | Performed by: INTERNAL MEDICINE

## 2020-01-28 PROCEDURE — 85610 PROTHROMBIN TIME: CPT | Performed by: INTERNAL MEDICINE

## 2020-01-28 PROCEDURE — 93306 TTE W/DOPPLER COMPLETE: CPT | Performed by: INTERNAL MEDICINE

## 2020-01-28 NOTE — TELEPHONE ENCOUNTER
Called LMOM for patient to return call d/t vm not identifying patient by name. Patient over due for INR apLPN

## 2020-01-28 NOTE — TELEPHONE ENCOUNTER
Pt returned call.  Reinforced inr overdue and safety measures.    Pt stated she was instructed there were no appts available until Feb and pt leaving Feb 13th for [place], would have drawn then...    Reinforced inr due. Offered appt for today - appt scheduled.

## 2020-03-03 ENCOUNTER — ANTICOAGULATION VISIT (OUTPATIENT)
Dept: CARDIOLOGY | Facility: CLINIC | Age: 70
End: 2020-03-03

## 2020-03-03 VITALS
WEIGHT: 160 LBS | BODY MASS INDEX: 28.34 KG/M2 | SYSTOLIC BLOOD PRESSURE: 110 MMHG | DIASTOLIC BLOOD PRESSURE: 64 MMHG | HEART RATE: 64 BPM

## 2020-03-03 DIAGNOSIS — I48.91 ATRIAL FIBRILLATION, UNSPECIFIED TYPE (HCC): ICD-10-CM

## 2020-03-03 DIAGNOSIS — Z95.3 HISTORY OF MITRAL VALVE REPLACEMENT WITH BIOPROSTHETIC VALVE: ICD-10-CM

## 2020-03-03 DIAGNOSIS — I27.0 PULMONARY HYPERTENSION, PRIMARY (HCC): ICD-10-CM

## 2020-03-03 DIAGNOSIS — Z79.01 LONG TERM (CURRENT) USE OF ANTICOAGULANTS: ICD-10-CM

## 2020-03-03 LAB — INR PPP: 2.2 (ref 0.9–1.1)

## 2020-03-03 PROCEDURE — 85610 PROTHROMBIN TIME: CPT | Performed by: INTERNAL MEDICINE

## 2020-03-03 PROCEDURE — 36416 COLLJ CAPILLARY BLOOD SPEC: CPT | Performed by: INTERNAL MEDICINE

## 2020-03-25 DIAGNOSIS — I48.20 CHRONIC ATRIAL FIBRILLATION (HCC): ICD-10-CM

## 2020-03-25 DIAGNOSIS — Z79.01 LONG TERM (CURRENT) USE OF ANTICOAGULANTS: ICD-10-CM

## 2020-03-25 RX ORDER — ROSUVASTATIN CALCIUM 5 MG/1
TABLET, COATED ORAL
Qty: 45 TABLET | Refills: 0 | Status: SHIPPED | OUTPATIENT
Start: 2020-03-25 | End: 2020-04-03

## 2020-03-25 RX ORDER — WARFARIN SODIUM 3 MG/1
TABLET ORAL
Qty: 90 TABLET | Refills: 0 | Status: SHIPPED | OUTPATIENT
Start: 2020-03-25 | End: 2020-04-03

## 2020-04-03 ENCOUNTER — APPOINTMENT (OUTPATIENT)
Dept: GENERAL RADIOLOGY | Facility: HOSPITAL | Age: 70
End: 2020-04-03

## 2020-04-03 ENCOUNTER — HOSPITAL ENCOUNTER (OUTPATIENT)
Facility: HOSPITAL | Age: 70
Setting detail: OBSERVATION
Discharge: HOME | End: 2020-04-05
Attending: EMERGENCY MEDICINE | Admitting: INTERNAL MEDICINE

## 2020-04-03 DIAGNOSIS — Z20.822 SUSPECTED 2019 NOVEL CORONAVIRUS INFECTION: ICD-10-CM

## 2020-04-03 DIAGNOSIS — I38 VALVULAR HEART DISEASE: ICD-10-CM

## 2020-04-03 DIAGNOSIS — J20.9 ACUTE BRONCHITIS, UNSPECIFIED ORGANISM: Primary | ICD-10-CM

## 2020-04-03 DIAGNOSIS — I27.20 PULMONARY HYPERTENSION (HCC): ICD-10-CM

## 2020-04-03 LAB
ALBUMIN SERPL-MCNC: 3.7 G/DL (ref 3.5–5.2)
ALBUMIN/GLOB SERPL: 1.2 G/DL
ALP SERPL-CCNC: 78 U/L (ref 39–117)
ALT SERPL W P-5'-P-CCNC: 20 U/L (ref 1–33)
ANION GAP SERPL CALCULATED.3IONS-SCNC: 12 MMOL/L (ref 5–15)
AST SERPL-CCNC: 25 U/L (ref 1–32)
B PERT DNA SPEC QL NAA+PROBE: NOT DETECTED
BASOPHILS # BLD AUTO: 0.1 10*3/MM3 (ref 0–0.2)
BASOPHILS NFR BLD AUTO: 0.9 % (ref 0–1.5)
BILIRUB SERPL-MCNC: 0.2 MG/DL (ref 0.2–1.2)
BUN BLD-MCNC: 22 MG/DL (ref 8–23)
BUN/CREAT SERPL: 21.4 (ref 7–25)
C PNEUM DNA NPH QL NAA+NON-PROBE: NOT DETECTED
CALCIUM SPEC-SCNC: 8.9 MG/DL (ref 8.6–10.5)
CHLORIDE SERPL-SCNC: 107 MMOL/L (ref 98–107)
CO2 SERPL-SCNC: 22 MMOL/L (ref 22–29)
CREAT BLD-MCNC: 1.03 MG/DL (ref 0.57–1)
DEPRECATED RDW RBC AUTO: 43.3 FL (ref 37–54)
EOSINOPHIL # BLD AUTO: 0.3 10*3/MM3 (ref 0–0.4)
EOSINOPHIL NFR BLD AUTO: 5.8 % (ref 0.3–6.2)
ERYTHROCYTE [DISTWIDTH] IN BLOOD BY AUTOMATED COUNT: 15.1 % (ref 12.3–15.4)
FLUAV H1 2009 PAND RNA NPH QL NAA+PROBE: NOT DETECTED
FLUAV H1 HA GENE NPH QL NAA+PROBE: NOT DETECTED
FLUAV H3 RNA NPH QL NAA+PROBE: NOT DETECTED
FLUAV SUBTYP SPEC NAA+PROBE: NOT DETECTED
FLUBV RNA ISLT QL NAA+PROBE: NOT DETECTED
GFR SERPL CREATININE-BSD FRML MDRD: 53 ML/MIN/1.73
GLOBULIN UR ELPH-MCNC: 3 GM/DL
GLUCOSE BLD-MCNC: 81 MG/DL (ref 65–99)
HADV DNA SPEC NAA+PROBE: NOT DETECTED
HCOV 229E RNA SPEC QL NAA+PROBE: NOT DETECTED
HCOV HKU1 RNA SPEC QL NAA+PROBE: NOT DETECTED
HCOV NL63 RNA SPEC QL NAA+PROBE: NOT DETECTED
HCOV OC43 RNA SPEC QL NAA+PROBE: NOT DETECTED
HCT VFR BLD AUTO: 30.9 % (ref 34–46.6)
HGB BLD-MCNC: 10.4 G/DL (ref 12–15.9)
HMPV RNA NPH QL NAA+NON-PROBE: NOT DETECTED
HOLD SPECIMEN: NORMAL
HOLD SPECIMEN: NORMAL
HPIV1 RNA SPEC QL NAA+PROBE: NOT DETECTED
HPIV2 RNA SPEC QL NAA+PROBE: NOT DETECTED
HPIV3 RNA NPH QL NAA+PROBE: NOT DETECTED
HPIV4 P GENE NPH QL NAA+PROBE: NOT DETECTED
INR PPP: 1.92 (ref 2–3)
LYMPHOCYTES # BLD AUTO: 2.3 10*3/MM3 (ref 0.7–3.1)
LYMPHOCYTES NFR BLD AUTO: 41.4 % (ref 19.6–45.3)
M PNEUMO IGG SER IA-ACNC: NOT DETECTED
MCH RBC QN AUTO: 27 PG (ref 26.6–33)
MCHC RBC AUTO-ENTMCNC: 33.5 G/DL (ref 31.5–35.7)
MCV RBC AUTO: 80.6 FL (ref 79–97)
MONOCYTES # BLD AUTO: 0.8 10*3/MM3 (ref 0.1–0.9)
MONOCYTES NFR BLD AUTO: 14.5 % (ref 5–12)
NEUTROPHILS # BLD AUTO: 2.1 10*3/MM3 (ref 1.7–7)
NEUTROPHILS NFR BLD AUTO: 37.4 % (ref 42.7–76)
NRBC BLD AUTO-RTO: 0.1 /100 WBC (ref 0–0.2)
NT-PROBNP SERPL-MCNC: 426.3 PG/ML (ref 5–900)
PLATELET # BLD AUTO: 236 10*3/MM3 (ref 140–450)
PMV BLD AUTO: 9.3 FL (ref 6–12)
POTASSIUM BLD-SCNC: 3.7 MMOL/L (ref 3.5–5.2)
PROT SERPL-MCNC: 6.7 G/DL (ref 6–8.5)
PROTHROMBIN TIME: 18.5 SECONDS (ref 19.4–28.5)
RBC # BLD AUTO: 3.84 10*6/MM3 (ref 3.77–5.28)
RHINOVIRUS RNA SPEC NAA+PROBE: NOT DETECTED
RSV RNA NPH QL NAA+NON-PROBE: NOT DETECTED
SODIUM BLD-SCNC: 141 MMOL/L (ref 136–145)
TROPONIN T SERPL-MCNC: <0.01 NG/ML (ref 0–0.03)
WBC NRBC COR # BLD: 5.5 10*3/MM3 (ref 3.4–10.8)
WHOLE BLOOD HOLD SPECIMEN: NORMAL
WHOLE BLOOD HOLD SPECIMEN: NORMAL

## 2020-04-03 PROCEDURE — 85025 COMPLETE CBC W/AUTO DIFF WBC: CPT | Performed by: EMERGENCY MEDICINE

## 2020-04-03 PROCEDURE — 0099U HC BIOFIRE FILMARRAY RESP PANEL 1: CPT | Performed by: EMERGENCY MEDICINE

## 2020-04-03 PROCEDURE — 80053 COMPREHEN METABOLIC PANEL: CPT | Performed by: EMERGENCY MEDICINE

## 2020-04-03 PROCEDURE — G0378 HOSPITAL OBSERVATION PER HR: HCPCS

## 2020-04-03 PROCEDURE — U0001 2019-NCOV DIAGNOSTIC P: HCPCS | Performed by: EMERGENCY MEDICINE

## 2020-04-03 PROCEDURE — 99284 EMERGENCY DEPT VISIT MOD MDM: CPT

## 2020-04-03 PROCEDURE — 93005 ELECTROCARDIOGRAM TRACING: CPT | Performed by: EMERGENCY MEDICINE

## 2020-04-03 PROCEDURE — 99219 PR INITIAL OBSERVATION CARE/DAY 50 MINUTES: CPT | Performed by: NURSE PRACTITIONER

## 2020-04-03 PROCEDURE — 87040 BLOOD CULTURE FOR BACTERIA: CPT | Performed by: EMERGENCY MEDICINE

## 2020-04-03 PROCEDURE — 93005 ELECTROCARDIOGRAM TRACING: CPT

## 2020-04-03 PROCEDURE — 85610 PROTHROMBIN TIME: CPT | Performed by: EMERGENCY MEDICINE

## 2020-04-03 PROCEDURE — 84484 ASSAY OF TROPONIN QUANT: CPT | Performed by: EMERGENCY MEDICINE

## 2020-04-03 PROCEDURE — 71045 X-RAY EXAM CHEST 1 VIEW: CPT

## 2020-04-03 PROCEDURE — 83880 ASSAY OF NATRIURETIC PEPTIDE: CPT | Performed by: EMERGENCY MEDICINE

## 2020-04-03 RX ORDER — AZITHROMYCIN 250 MG/1
500 TABLET, FILM COATED ORAL
Status: DISCONTINUED | OUTPATIENT
Start: 2020-04-03 | End: 2020-04-04

## 2020-04-03 RX ORDER — ACETAMINOPHEN 500 MG
1000 TABLET ORAL ONCE
Status: COMPLETED | OUTPATIENT
Start: 2020-04-03 | End: 2020-04-03

## 2020-04-03 RX ORDER — METOPROLOL SUCCINATE 25 MG/1
25 TABLET, EXTENDED RELEASE ORAL DAILY
COMMUNITY
End: 2020-04-14 | Stop reason: SDUPTHER

## 2020-04-03 RX ORDER — WARFARIN SODIUM 3 MG/1
3 TABLET ORAL DAILY
COMMUNITY
End: 2020-04-14 | Stop reason: SDUPTHER

## 2020-04-03 RX ORDER — ROSUVASTATIN CALCIUM 5 MG/1
2.5 TABLET, COATED ORAL DAILY
COMMUNITY
End: 2020-07-06 | Stop reason: SDUPTHER

## 2020-04-03 RX ADMIN — ACETAMINOPHEN 1000 MG: 500 TABLET, FILM COATED ORAL at 23:04

## 2020-04-03 RX ADMIN — AZITHROMYCIN MONOHYDRATE 500 MG: 250 TABLET ORAL at 23:04

## 2020-04-03 RX ADMIN — NITROGLYCERIN 0.5 INCH: 20 OINTMENT TOPICAL at 21:00

## 2020-04-03 NOTE — ED TRIAGE NOTES
"Pt reports heart \"flutters\" that started earlier today,c/o SOA and lightheadedness with these episodes, and with activity reports non-productive cough. Pt spoke with on call nurse at PCP office and was told to come here for further eval.  "

## 2020-04-04 PROBLEM — Z20.822 SUSPECTED COVID-19 VIRUS INFECTION: Status: ACTIVE | Noted: 2020-04-04

## 2020-04-04 PROBLEM — D50.0 IRON DEFICIENCY ANEMIA DUE TO CHRONIC BLOOD LOSS: Chronic | Status: ACTIVE | Noted: 2020-04-04

## 2020-04-04 LAB
ANION GAP SERPL CALCULATED.3IONS-SCNC: 9 MMOL/L (ref 5–15)
BASOPHILS # BLD AUTO: 0.1 10*3/MM3 (ref 0–0.2)
BASOPHILS NFR BLD AUTO: 1.2 % (ref 0–1.5)
BUN BLD-MCNC: 24 MG/DL (ref 8–23)
BUN/CREAT SERPL: 20.3 (ref 7–25)
CALCIUM SPEC-SCNC: 8.9 MG/DL (ref 8.6–10.5)
CHLORIDE SERPL-SCNC: 109 MMOL/L (ref 98–107)
CHOLEST SERPL-MCNC: 144 MG/DL (ref 0–200)
CO2 SERPL-SCNC: 25 MMOL/L (ref 22–29)
CREAT BLD-MCNC: 1.18 MG/DL (ref 0.57–1)
CRP SERPL-MCNC: 0.29 MG/DL (ref 0–0.5)
DEPRECATED RDW RBC AUTO: 43.3 FL (ref 37–54)
EOSINOPHIL # BLD AUTO: 0.4 10*3/MM3 (ref 0–0.4)
EOSINOPHIL NFR BLD AUTO: 6.8 % (ref 0.3–6.2)
ERYTHROCYTE [DISTWIDTH] IN BLOOD BY AUTOMATED COUNT: 15.1 % (ref 12.3–15.4)
FERRITIN SERPL-MCNC: 7.22 NG/ML (ref 13–150)
GFR SERPL CREATININE-BSD FRML MDRD: 45 ML/MIN/1.73
GLUCOSE BLD-MCNC: 107 MG/DL (ref 65–99)
HCT VFR BLD AUTO: 28.9 % (ref 34–46.6)
HDLC SERPL-MCNC: 57 MG/DL (ref 40–60)
HGB BLD-MCNC: 10 G/DL (ref 12–15.9)
INR PPP: 1.85 (ref 2–3)
IRON 24H UR-MRATE: 28 MCG/DL (ref 37–145)
IRON SATN MFR SERPL: 7 % (ref 20–50)
LDH SERPL-CCNC: 247 U/L (ref 135–214)
LDLC SERPL CALC-MCNC: 63 MG/DL (ref 0–100)
LDLC/HDLC SERPL: 1.1 {RATIO}
LYMPHOCYTES # BLD AUTO: 2.2 10*3/MM3 (ref 0.7–3.1)
LYMPHOCYTES NFR BLD AUTO: 38.9 % (ref 19.6–45.3)
MCH RBC QN AUTO: 28 PG (ref 26.6–33)
MCHC RBC AUTO-ENTMCNC: 34.5 G/DL (ref 31.5–35.7)
MCV RBC AUTO: 81.3 FL (ref 79–97)
MONOCYTES # BLD AUTO: 0.9 10*3/MM3 (ref 0.1–0.9)
MONOCYTES NFR BLD AUTO: 15.4 % (ref 5–12)
NEUTROPHILS # BLD AUTO: 2.2 10*3/MM3 (ref 1.7–7)
NEUTROPHILS NFR BLD AUTO: 37.7 % (ref 42.7–76)
NRBC BLD AUTO-RTO: 0.1 /100 WBC (ref 0–0.2)
PLATELET # BLD AUTO: 210 10*3/MM3 (ref 140–450)
PMV BLD AUTO: 9.6 FL (ref 6–12)
POTASSIUM BLD-SCNC: 3.8 MMOL/L (ref 3.5–5.2)
PROCALCITONIN SERPL-MCNC: 0.08 NG/ML (ref 0.1–0.25)
PROTHROMBIN TIME: 17.9 SECONDS (ref 19.4–28.5)
RBC # BLD AUTO: 3.56 10*6/MM3 (ref 3.77–5.28)
RETICS # AUTO: 0.04 10*6/MM3 (ref 0.02–0.13)
RETICS/RBC NFR AUTO: 1.04 % (ref 0.7–1.9)
SODIUM BLD-SCNC: 143 MMOL/L (ref 136–145)
TIBC SERPL-MCNC: 396 MCG/DL (ref 298–536)
TRANSFERRIN SERPL-MCNC: 266 MG/DL (ref 200–360)
TRIGL SERPL-MCNC: 122 MG/DL (ref 0–150)
TROPONIN T SERPL-MCNC: <0.01 NG/ML (ref 0–0.03)
TSH SERPL DL<=0.05 MIU/L-ACNC: 0.36 UIU/ML (ref 0.27–4.2)
VIT B12 BLD-MCNC: 610 PG/ML (ref 211–946)
VLDLC SERPL-MCNC: 24.4 MG/DL
WBC NRBC COR # BLD: 5.7 10*3/MM3 (ref 3.4–10.8)

## 2020-04-04 PROCEDURE — 82728 ASSAY OF FERRITIN: CPT | Performed by: NURSE PRACTITIONER

## 2020-04-04 PROCEDURE — 86140 C-REACTIVE PROTEIN: CPT | Performed by: NURSE PRACTITIONER

## 2020-04-04 PROCEDURE — G0378 HOSPITAL OBSERVATION PER HR: HCPCS

## 2020-04-04 PROCEDURE — 25010000002 IRON SUCROSE PER 1 MG: Performed by: INTERNAL MEDICINE

## 2020-04-04 PROCEDURE — 96372 THER/PROPH/DIAG INJ SC/IM: CPT

## 2020-04-04 PROCEDURE — 25010000002 ENOXAPARIN PER 10 MG: Performed by: INTERNAL MEDICINE

## 2020-04-04 PROCEDURE — 93005 ELECTROCARDIOGRAM TRACING: CPT | Performed by: NURSE PRACTITIONER

## 2020-04-04 PROCEDURE — 82607 VITAMIN B-12: CPT | Performed by: NURSE PRACTITIONER

## 2020-04-04 PROCEDURE — 85610 PROTHROMBIN TIME: CPT | Performed by: NURSE PRACTITIONER

## 2020-04-04 PROCEDURE — 85045 AUTOMATED RETICULOCYTE COUNT: CPT | Performed by: INTERNAL MEDICINE

## 2020-04-04 PROCEDURE — 80048 BASIC METABOLIC PNL TOTAL CA: CPT | Performed by: NURSE PRACTITIONER

## 2020-04-04 PROCEDURE — 83615 LACTATE (LD) (LDH) ENZYME: CPT | Performed by: NURSE PRACTITIONER

## 2020-04-04 PROCEDURE — 99226 PR SBSQ OBSERVATION CARE/DAY 35 MINUTES: CPT | Performed by: INTERNAL MEDICINE

## 2020-04-04 PROCEDURE — 84443 ASSAY THYROID STIM HORMONE: CPT | Performed by: NURSE PRACTITIONER

## 2020-04-04 PROCEDURE — 96365 THER/PROPH/DIAG IV INF INIT: CPT

## 2020-04-04 PROCEDURE — 84145 PROCALCITONIN (PCT): CPT | Performed by: NURSE PRACTITIONER

## 2020-04-04 PROCEDURE — 80061 LIPID PANEL: CPT | Performed by: NURSE PRACTITIONER

## 2020-04-04 PROCEDURE — 84466 ASSAY OF TRANSFERRIN: CPT | Performed by: INTERNAL MEDICINE

## 2020-04-04 PROCEDURE — 83540 ASSAY OF IRON: CPT | Performed by: INTERNAL MEDICINE

## 2020-04-04 PROCEDURE — 85025 COMPLETE CBC W/AUTO DIFF WBC: CPT | Performed by: NURSE PRACTITIONER

## 2020-04-04 PROCEDURE — 84484 ASSAY OF TROPONIN QUANT: CPT | Performed by: NURSE PRACTITIONER

## 2020-04-04 RX ORDER — METOPROLOL SUCCINATE 25 MG/1
25 TABLET, EXTENDED RELEASE ORAL DAILY
Status: DISCONTINUED | OUTPATIENT
Start: 2020-04-04 | End: 2020-04-05 | Stop reason: HOSPADM

## 2020-04-04 RX ORDER — ACETAMINOPHEN 160 MG/5ML
650 SOLUTION ORAL EVERY 4 HOURS PRN
Status: DISCONTINUED | OUTPATIENT
Start: 2020-04-04 | End: 2020-04-05 | Stop reason: HOSPADM

## 2020-04-04 RX ORDER — ALPRAZOLAM 0.5 MG/1
0.5 TABLET ORAL 2 TIMES DAILY PRN
Status: DISCONTINUED | OUTPATIENT
Start: 2020-04-04 | End: 2020-04-05 | Stop reason: HOSPADM

## 2020-04-04 RX ORDER — ONDANSETRON 4 MG/1
4 TABLET, FILM COATED ORAL EVERY 6 HOURS PRN
Status: DISCONTINUED | OUTPATIENT
Start: 2020-04-04 | End: 2020-04-05 | Stop reason: HOSPADM

## 2020-04-04 RX ORDER — CHOLECALCIFEROL (VITAMIN D3) 125 MCG
5 CAPSULE ORAL NIGHTLY PRN
Status: DISCONTINUED | OUTPATIENT
Start: 2020-04-04 | End: 2020-04-05 | Stop reason: HOSPADM

## 2020-04-04 RX ORDER — ESCITALOPRAM OXALATE 10 MG/1
20 TABLET ORAL DAILY
Status: DISCONTINUED | OUTPATIENT
Start: 2020-04-04 | End: 2020-04-05 | Stop reason: HOSPADM

## 2020-04-04 RX ORDER — ASPIRIN 81 MG/1
81 TABLET, CHEWABLE ORAL DAILY
Status: DISCONTINUED | OUTPATIENT
Start: 2020-04-04 | End: 2020-04-05 | Stop reason: HOSPADM

## 2020-04-04 RX ORDER — ONDANSETRON 2 MG/ML
4 INJECTION INTRAMUSCULAR; INTRAVENOUS EVERY 6 HOURS PRN
Status: DISCONTINUED | OUTPATIENT
Start: 2020-04-04 | End: 2020-04-05 | Stop reason: HOSPADM

## 2020-04-04 RX ORDER — ACETAMINOPHEN 325 MG/1
650 TABLET ORAL EVERY 4 HOURS PRN
Status: DISCONTINUED | OUTPATIENT
Start: 2020-04-04 | End: 2020-04-05 | Stop reason: HOSPADM

## 2020-04-04 RX ORDER — PANTOPRAZOLE SODIUM 40 MG/1
40 TABLET, DELAYED RELEASE ORAL DAILY
Status: DISCONTINUED | OUTPATIENT
Start: 2020-04-04 | End: 2020-04-05 | Stop reason: HOSPADM

## 2020-04-04 RX ORDER — ACETAMINOPHEN 650 MG/1
650 SUPPOSITORY RECTAL EVERY 4 HOURS PRN
Status: DISCONTINUED | OUTPATIENT
Start: 2020-04-04 | End: 2020-04-05 | Stop reason: HOSPADM

## 2020-04-04 RX ORDER — ROSUVASTATIN CALCIUM 5 MG/1
2.5 TABLET, COATED ORAL NIGHTLY
Status: DISCONTINUED | OUTPATIENT
Start: 2020-04-04 | End: 2020-04-05 | Stop reason: HOSPADM

## 2020-04-04 RX ORDER — CETIRIZINE HYDROCHLORIDE 10 MG/1
10 TABLET ORAL DAILY
Status: DISCONTINUED | OUTPATIENT
Start: 2020-04-04 | End: 2020-04-05 | Stop reason: HOSPADM

## 2020-04-04 RX ORDER — MULTIPLE VITAMINS W/ MINERALS TAB 2148-113
1 TAB ORAL DAILY
Status: DISCONTINUED | OUTPATIENT
Start: 2020-04-04 | End: 2020-04-05 | Stop reason: HOSPADM

## 2020-04-04 RX ORDER — BUMETANIDE 1 MG/1
1 TABLET ORAL DAILY
Status: DISCONTINUED | OUTPATIENT
Start: 2020-04-04 | End: 2020-04-05 | Stop reason: HOSPADM

## 2020-04-04 RX ORDER — BISACODYL 5 MG/1
5 TABLET, DELAYED RELEASE ORAL DAILY PRN
Status: DISCONTINUED | OUTPATIENT
Start: 2020-04-04 | End: 2020-04-05 | Stop reason: HOSPADM

## 2020-04-04 RX ORDER — WARFARIN SODIUM 6 MG/1
3 TABLET ORAL
Status: DISCONTINUED | OUTPATIENT
Start: 2020-04-04 | End: 2020-04-05 | Stop reason: HOSPADM

## 2020-04-04 RX ORDER — FLUTICASONE PROPIONATE 50 MCG
1 SPRAY, SUSPENSION (ML) NASAL DAILY
Status: DISCONTINUED | OUTPATIENT
Start: 2020-04-04 | End: 2020-04-04

## 2020-04-04 RX ORDER — SILDENAFIL CITRATE 20 MG/1
20 TABLET ORAL 3 TIMES DAILY
Status: DISCONTINUED | OUTPATIENT
Start: 2020-04-04 | End: 2020-04-05 | Stop reason: HOSPADM

## 2020-04-04 RX ADMIN — ROSUVASTATIN CALCIUM 2.5 MG: 5 TABLET, FILM COATED ORAL at 00:49

## 2020-04-04 RX ADMIN — PANTOPRAZOLE SODIUM 40 MG: 40 TABLET, DELAYED RELEASE ORAL at 08:26

## 2020-04-04 RX ADMIN — ROSUVASTATIN CALCIUM 2.5 MG: 5 TABLET, FILM COATED ORAL at 20:12

## 2020-04-04 RX ADMIN — ALPRAZOLAM 0.5 MG: 0.5 TABLET ORAL at 20:12

## 2020-04-04 RX ADMIN — SILDENAFIL 20 MG: 20 TABLET, FILM COATED ORAL at 16:28

## 2020-04-04 RX ADMIN — ESCITALOPRAM OXALATE 20 MG: 10 TABLET ORAL at 08:26

## 2020-04-04 RX ADMIN — ALPRAZOLAM 0.5 MG: 0.5 TABLET ORAL at 00:50

## 2020-04-04 RX ADMIN — MULTIPLE VITAMINS W/ MINERALS TAB 1 TABLET: TAB at 08:26

## 2020-04-04 RX ADMIN — ENOXAPARIN SODIUM 70 MG: 80 INJECTION SUBCUTANEOUS at 14:34

## 2020-04-04 RX ADMIN — CETIRIZINE HYDROCHLORIDE 10 MG: 10 TABLET, FILM COATED ORAL at 08:26

## 2020-04-04 RX ADMIN — WARFARIN 3 MG: 6 TABLET ORAL at 16:53

## 2020-04-04 RX ADMIN — LEVOTHYROXINE SODIUM 137 MCG: 25 TABLET ORAL at 08:26

## 2020-04-04 RX ADMIN — SILDENAFIL 20 MG: 20 TABLET, FILM COATED ORAL at 20:12

## 2020-04-04 RX ADMIN — METOPROLOL SUCCINATE 25 MG: 25 TABLET, EXTENDED RELEASE ORAL at 08:26

## 2020-04-04 RX ADMIN — MACITENTAN 10 MG: 10 TABLET, FILM COATED ORAL at 10:21

## 2020-04-04 RX ADMIN — SILDENAFIL 20 MG: 20 TABLET, FILM COATED ORAL at 00:49

## 2020-04-04 RX ADMIN — SODIUM CHLORIDE 400 MG: 900 INJECTION, SOLUTION INTRAVENOUS at 16:28

## 2020-04-04 RX ADMIN — BUMETANIDE 1 MG: 1 TABLET ORAL at 08:26

## 2020-04-04 RX ADMIN — SILDENAFIL 20 MG: 20 TABLET, FILM COATED ORAL at 08:30

## 2020-04-04 RX ADMIN — ASPIRIN 81 MG 81 MG: 81 TABLET ORAL at 08:30

## 2020-04-04 NOTE — ED PROVIDER NOTES
Subjective   70-year-old female complaining of nonproductive cough for the last 3 days.  She reports in the last 24 hours she has had fever and chills.  She reports that she has had no diarrhea.  She reports no change in taste.  Patient states she has not had palpitations or diaphoresis.  She states that she is no longer using home oxygen but has had to return to CPAP for obstructive sleep apnea.  The patient states that she is taking blood thinners and has had valvular heart surgery.  She states she is been told she has cardiac blockages but they did not require intervention.  She states that she has had chest pain that makes her short of breath and has a pleuritic component.  The patient reports that she has had no leg swelling.  She states she is concerned about coronavirus infection because she lives with her son who is laid off from [employer] but her daughter-in-law who is continuing to work at [employer]          Review of Systems   Constitutional: Positive for chills and fever.   HENT: Negative for sore throat.    Eyes: Negative for visual disturbance.   Respiratory: Positive for cough, chest tightness and shortness of breath.    Cardiovascular: Positive for chest pain. Negative for palpitations and leg swelling.   Gastrointestinal: Negative for abdominal pain.   Hematological: Bruises/bleeds easily.       Past Medical History:   Diagnosis Date   • Atrial fibrillation (CMS/HCC)    • Hyperlipidemia    • Hypertension    • Sleep apnea    • Valvular disease        Allergies   Allergen Reactions   • Adhesive Tape Unknown (See Comments)   • Codeine Unknown (See Comments)       Past Surgical History:   Procedure Laterality Date   • ABDOMINAL SURGERY  2016    gastric bypass   • ADENOIDECTOMY     • BLADDER SURGERY      bladder sling   • CARDIAC CATHETERIZATION  2013 2015, 2017   • CHOLECYSTECTOMY     • HYSTERECTOMY     • MITRAL VALVE REPLACEMENT  2013   • THYROID SURGERY     • TONSILLECTOMY     • TRICUSPID VALVE REPLACEMENT   2013       Family History   Problem Relation Age of Onset   • Heart disease Father    • Heart disease Brother    • Heart disease Paternal Uncle        Social History     Socioeconomic History   • Marital status:      Spouse name: Not on file   • Number of children: Not on file   • Years of education: Not on file   • Highest education level: Not on file   Tobacco Use   • Smoking status: Never Smoker   • Smokeless tobacco: Never Used   Substance and Sexual Activity   • Alcohol use: Not Currently   • Drug use: Never   • Sexual activity: Defer           Objective   Physical Exam  Alert Beto Coma Scale 15   HEENT: Pupils equal and reactive to light. Conjunctivae are not injected. normal tympanic membranes. Oropharynx and nares are normal.   Neck: Supple. Midline trachea. No JVD. No goiter.   Chest: Scattered rhonchi and wheezes bilaterally and equal breath sounds bilaterally regular rate and rhythm without  rub.  There is a 2/6 late systolic murmur   Abdomen: Positive bowel sounds nontender nondistended. No rebound or peritoneal signs. No CVA tenderness.   Extremities no clubbing cyanosis or edema motor sensory exam is normal the full range of motion is intact   skin: Warm and dry, no rashes or petechia.   Lymphatic: No regional lymphadenopathy. No calf pain, swelling or Cam's sign    Procedures           ED Course           Labs Reviewed   PROTIME-INR - Abnormal; Notable for the following components:       Result Value    Protime 18.5 (*)     INR 1.92 (*)     All other components within normal limits   COMPREHENSIVE METABOLIC PANEL - Abnormal; Notable for the following components:    Creatinine 1.03 (*)     eGFR Non  Amer 53 (*)     All other components within normal limits    Narrative:     GFR Normal >60  Chronic Kidney Disease <60  Kidney Failure <15     CBC WITH AUTO DIFFERENTIAL - Abnormal; Notable for the following components:    Hemoglobin 10.4 (*)     Hematocrit 30.9 (*)     Neutrophil %  37.4 (*)     Monocyte % 14.5 (*)     All other components within normal limits   RESPIRATORY PANEL, PCR - Normal    Narrative:     The coronavirus on the RVP is NOT COVID-19 and is NOT indicative of infection with COVID-19.    TROPONIN (IN-HOUSE) - Normal    Narrative:     Troponin T Reference Range:  <= 0.03 ng/mL-   Negative for AMI  >0.03 ng/mL-     Abnormal for myocardial necrosis.  Clinicians would have to utilize clinical acumen, EKG, Troponin and serial changes to determine if it is an Acute Myocardial Infarction or myocardial injury due to an underlying chronic condition.       Results may be falsely decreased if patient taking Biotin.     BNP (IN-HOUSE) - Normal    Narrative:     Among patients with dyspnea, NT-proBNP is highly sensitive for the detection of acute congestive heart failure. In addition NT-proBNP of <300 pg/ml effectively rules out acute congestive heart failure with 99% negative predictive value.    Results may be falsely decreased if patient taking Biotin.     BLOOD CULTURE   BLOOD CULTURE   NOVEL CORONAVIRUS (COVID-19),RT-PCR, STATE LAB,NP SWAB IN TRANSPORT MEDIA   RAINBOW DRAW    Narrative:     The following orders were created for panel order [place] Draw.  Procedure                               Abnormality         Status                     ---------                               -----------         ------                     Light Blue Top[[id number]]                                   Final result               Green Top (Gel)[[id number]]                                  Final result               Lavender Top[[id number]]                                     Final result               Gold Top - SST[[id number]]                                   Final result                 Please view results for these tests on the individual orders.   LIGHT BLUE TOP   GREEN TOP   LAVENDER TOP   GOLD TOP - SST   CBC AND DIFFERENTIAL    Narrative:     The following orders were created for panel order CBC &  Differential.  Procedure                               Abnormality         Status                     ---------                               -----------         ------                     CBC Auto Differential[[id number]]        Abnormal            Final result                 Please view results for these tests on the individual orders.     Medications   nitroglycerin (NITROSTAT) ointment 0.5 inch (0.5 inches Topical Given 4/3/20 2100)     Xr Chest 1 View    Result Date: 4/3/2020  1. There is borderline cardiomegaly. There are chronic changes in the lungs. No acute process demonstrated.  Electronically Signed By-Bibiana Moya On:4/3/2020 9:35 PM This report was finalized on [id number] by  Bibiana oMya, .                                    MDM  Number of Diagnoses or Management Options     Amount and/or Complexity of Data Reviewed  Clinical lab tests: reviewed  Tests in the radiology section of CPT®: reviewed  Tests in the medicine section of CPT®: reviewed    Risk of Complications, Morbidity, and/or Mortality  Presenting problems: high  Diagnostic procedures: high  Management options: high  General comments: The patient had a coronavirus test pending at the state lab.  Patient be observed overnight will obtain serial troponin and EKG.  Repeat echocardiogram may further define cardiac function and potential source of discomfort.  The patient was agreeable to this plan of treatment        Final diagnoses:   Acute bronchitis, unspecified organism   Suspected 2019 Novel Coronavirus Infection   Pulmonary hypertension (CMS/HCC)   Valvular heart disease            Paulo Giang MD  04/03/20 [id number]

## 2020-04-04 NOTE — H&P
[facility] Medicine Services      Patient Name: Aleena Benson  : 1950  MRN: [id number]  Primary Care Physician: Jonelle Medina APRN  Date of admission: 4/3/2020    Patient Care Team:  Jonelle Medina APRN as PCP - Simon Rodríguez MD as Consulting Physician (Cardiology)          Subjective   History Present Illness     Chief Complaint:   Chief Complaint   Patient presents with   • Shortness of Breath       Ms. Benson is a 70 y.o.  female with a history of atrial fibrillation, hyperlipidemia, pulmonary hypertension, hypothyroidism, mitral valve replacement presented to the [facility] ED on 4/3/2020 with a complaint of dyspnea upon exertion, shortness of breath, heart palpitations and a fever earlier in the day today.  Patient states her shortness of breath is worse when she gets up and moves around and walks.  Patient also states her palpitations are worse with exertion, she can feel her heart race when she gets up and walks.  Patient took an extra metoprolol before arriving at the ED.  Patient denies cough, body aches, nausea, vomiting, diarrhea.  Patient is currently living with her son and granddaughter.  Son recently laid off from   [employer].  However, granddaughter is still working at [employer].    In the ED, initial troponin negative, .3, potassium 3.7, BUN 22, creatinine 1.03, PT 18.5, INR 1.92, WBC 5.5, Hgb 10.4, HCT 30.9, blood cultures pending, respiratory panel negative,  EKG: Normal sinus rhythm.  Chest x-ray: Cardiomegaly, no active disease process.  Novel coronavirus pending.    Review of Systems   Constitution: Positive for chills and fever.   HENT: Negative.    Eyes: Negative.    Cardiovascular: Positive for dyspnea on exertion.   Respiratory: Positive for shortness of breath.    Endocrine: Negative.    Hematologic/Lymphatic: Negative.    Skin: Negative.    Musculoskeletal: Negative.    Gastrointestinal: Negative.    Genitourinary: Negative.     Neurological: Negative.    Psychiatric/Behavioral: Negative.    Allergic/Immunologic: Negative.    All other systems reviewed and are negative.          Personal History     Past Medical History:   Past Medical History:   Diagnosis Date   • Atrial fibrillation (CMS/HCC)    • Hyperlipidemia    • Hypertension    • Sleep apnea    • Valvular disease        Surgical History:      Past Surgical History:   Procedure Laterality Date   • ABDOMINAL SURGERY  2016    gastric bypass   • ADENOIDECTOMY     • BLADDER SURGERY      bladder sling   • CARDIAC CATHETERIZATION  2013 2015, 2017   • CHOLECYSTECTOMY     • HYSTERECTOMY     • MITRAL VALVE REPLACEMENT  2013   • THYROID SURGERY     • TONSILLECTOMY     • TRICUSPID VALVE REPLACEMENT  2013           Family History: family history includes Heart disease in her brother, father, and paternal uncle. Otherwise pertinent FHx was reviewed and unremarkable.     Social History:  reports that she has never smoked. She has never used smokeless tobacco. She reports that she drank alcohol. She reports that she does not use drugs.      Medications:  Prior to Admission medications    Medication Sig Start Date End Date Taking? Authorizing Provider   ALPRAZolam (XANAX) 0.5 MG tablet Take 0.5 mg by mouth 2 (Two) Times a Day As Needed for Anxiety. 8/7/19  Yes Travis Rangel MD   aspirin 81 MG tablet Take 81 mg by mouth Daily. 6/8/15  Yes ProviderTravis MD   bumetanide (BUMEX) 1 MG tablet Take 1 tablet by mouth Daily. 1/3/20  Yes Simon Day MD   Calcium Carb-Cholecalciferol 500-400 MG-UNIT chewable tablet Chew 1 tablet Daily.   Yes ProviderTravis MD   cetirizine (ZYRTEC ALLERGY) 10 MG tablet Take 10 mg by mouth Daily. 11/21/14  Yes Travis Rangel MD   Cholecalciferol (VITAMIN D-3) 1000 units capsule Take 1,000 Units by mouth Daily. 7/19/18  Yes Travis Rangel MD   escitalopram (LEXAPRO) 20 MG tablet Take 20 mg by mouth Daily. 5/24/13  Yes Provider  MD Travis   fluticasone (FLONASE) 50 MCG/ACT nasal spray 1 spray into the nostril(s) as directed by provider Daily.   Yes Travis Rangel MD   levothyroxine (SYNTHROID) 137 MCG tablet Take 137 mcg by mouth Daily. 5/24/13  Yes Travis Rangel MD   Macitentan (OPSUMIT) 10 MG tablet Take 10 mg by mouth Daily. 2/1/17  Yes Travis Rangel MD   metoprolol succinate XL (TOPROL-XL) 25 MG 24 hr tablet Take 25 mg by mouth Daily.   Yes Travis Rangel MD   Multiple Vitamins-Minerals (CENTRUM SILVER) tablet Take 1 tablet by mouth Daily. 7/19/18  Yes Travis Rangel MD   omeprazole (priLOSEC) 40 MG capsule Take 40 mg by mouth Daily. 7/22/15  Yes Travis Rangel MD   rosuvastatin (CRESTOR) 5 MG tablet Take 2.5 mg by mouth Daily.   Yes Travis Rangel MD   sildenafil (REVATIO) 20 MG tablet Take 20 mg by mouth 3 (Three) Times a Day. 5/24/13  Yes Travis Rangel MD   warfarin (COUMADIN) 3 MG tablet Take 3 mg by mouth Daily.   Yes Travis Rangel MD   metoprolol succinate XL (TOPROL-XL) 25 MG 24 hr tablet TAKE 1 TABLET BY MOUTH ONCE DAILY  Patient taking differently: 25 mg. 1/20/20 4/3/20 Yes Simon Day MD   rosuvastatin (CRESTOR) 5 MG tablet Take 1/2 (one-half) tablet by mouth once daily 3/25/20 4/3/20 Yes Simon Day MD   warfarin (COUMADIN) 3 MG tablet TAKE 1 TABLET BY MOUTH ONCE DAILY OR AS  DIRECTED, CONTINUE TO HAVE INR CHECKED ON A REGULAR BASIS 3/25/20 4/3/20 Yes Simon Day MD   Denosumab (PROLIA SC) Inject 60 mg as directed Every 6 (Six) Months. 7/19/18   Travis Rangel MD   amiodarone (PACERONE) 200 MG tablet Take  by mouth 2 (Two) Times a Day. 7/25/13 4/3/20  Travis Rangel MD   conjugated estrogens (PREMARIN) 0.625 MG/GM vaginal cream Insert  into the vagina. 5/24/13 4/3/20  Travis Rangel MD   Cyanocobalamin (B-12 COMPLIANCE INJECTION) 1000 MCG/ML kit B-12 COMPLIANCE INJECTION 1000 MCG/ML KIT 7/19/18 4/3/20  Provider, Travis,  MD   enoxaparin (LOVENOX) 80 MG/0.8ML solution syringe Inject 70mg subcutaneous every 12 hours. Pharmacy to advise regarding dosing from 80mg syringe. 10/11/19 4/3/20  Simon Day MD   metFORMIN (GLUCOPHAGE) 1000 MG tablet Take  by mouth 2 (Two) Times a Day. 5/24/13 4/3/20  Travis Rangel MD   ondansetron (ZOFRAN) 4 MG tablet Take  by mouth Every 4 (Four) Hours As Needed. 5/24/13 4/3/20  Travis Rangel MD   oxyCODONE-acetaminophen (PERCOCET) 7.5-325 MG per tablet OXYCODONE-ACETAMINOPHEN 7.5-325 MG TABS 2/1/17 4/3/20  Travis Rangel MD   spironolactone (ALDACTONE) 25 MG tablet Take  by mouth Daily. 5/24/13 4/3/20  Travis Rangel MD   SYNTHROID 125 MCG tablet Take 125 mcg by mouth Daily. 5/31/19 4/3/20  Travis Rangel MD       Allergies:    Allergies   Allergen Reactions   • Adhesive Tape Unknown (See Comments)   • Codeine Unknown (See Comments)       Objective   Objective     Vital Signs  Temp:  [97.6 °F (36.4 °C)-99.8 °F (37.7 °C)] 97.6 °F (36.4 °C)  Heart Rate:  [73-92] 73  Resp:  [15-19] 15  BP: (101-142)/(58-96) 101/58  SpO2:  [93 %-98 %] 95 %  on   ;   Device (Oxygen Therapy): room air  Body mass index is 27.84 kg/m².    Physical Exam   Constitutional: She is oriented to person, place, and time. She appears well-developed and well-nourished.   Eyes: Pupils are equal, round, and reactive to light. Conjunctivae are normal.   Neck: Normal range of motion.   Cardiovascular: Normal rate, regular rhythm and intact distal pulses.   Murmur heard.  Pulmonary/Chest: Effort normal and breath sounds normal.   Abdominal: Soft. Bowel sounds are normal.   Musculoskeletal: Normal range of motion.   Neurological: She is alert and oriented to person, place, and time.   Skin: Skin is warm and dry. Capillary refill takes less than 2 seconds.   Psychiatric: She has a normal mood and affect.       Results Review:  I have personally reviewed most recent cardiac tracings, lab results, microbiology  results and radiology images and interpretations and agree with findings.    Results from last 7 days   Lab Units 04/04/20 0243   WBC 10*3/mm3 5.70   HEMOGLOBIN g/dL 10.0*   HEMATOCRIT % 28.9*   PLATELETS 10*3/mm3 210   INR  1.85*     Results from last 7 days   Lab Units 04/04/20  0243 04/03/20 2016   SODIUM mmol/L 143 141   POTASSIUM mmol/L 3.8 3.7   CHLORIDE mmol/L 109* 107   CO2 mmol/L 25.0 22.0   BUN mg/dL 24* 22   CREATININE mg/dL 1.18* 1.03*   GLUCOSE mg/dL 107* 81   CALCIUM mg/dL 8.9 8.9   ALT (SGPT) U/L  --  20   AST (SGOT) U/L  --  25   TROPONIN T ng/mL <0.010 <0.010   PROBNP pg/mL  --  426.3     Estimated Creatinine Clearance: 41.4 mL/min (A) (by C-G formula based on SCr of 1.18 mg/dL (H)).  Brief Urine Lab Results     None          Microbiology Results (last 10 days)     Procedure Component Value - Date/Time    Respiratory Panel, PCR - Swab, Nasopharynx [[id number]]  (Normal) Collected:  04/03/20 2032    Lab Status:  Final result Specimen:  Swab from Nasopharynx Updated:  04/03/20 2211     ADENOVIRUS, PCR Not Detected     Coronavirus 229E Not Detected     Coronavirus HKU1 Not Detected     Coronavirus NL63 Not Detected     Coronavirus OC43 Not Detected     Human Metapneumovirus Not Detected     Human Rhinovirus/Enterovirus Not Detected     Influenza B PCR Not Detected     Parainfluenza Virus 1 Not Detected     Parainfluenza Virus 2 Not Detected     Parainfluenza Virus 3 Not Detected     Parainfluenza Virus 4 Not Detected     Bordetella pertussis pcr Not Detected     Influenza A H1 2009 PCR Not Detected     Chlamydophila pneumoniae PCR Not Detected     Mycoplasma pneumo by PCR Not Detected     Influenza A PCR Not Detected     Influenza A H3 Not Detected     Influenza A H1 Not Detected     RSV, PCR Not Detected    Narrative:       The coronavirus on the RVP is NOT COVID-19 and is NOT indicative of infection with COVID-19.           ECG/EMG Results (most recent)     Procedure Component Value Units Date/Time     ECG 12 Lead [[id number]] Collected:  04/03/20 1958     Updated:  04/03/20 1959    Narrative:       HEART RATE= 86  bpm  RR Interval= 696  ms  TX Interval= 140  ms  P Horizontal Axis= 31  deg  P Front Axis= 64  deg  QRSD Interval= 94  ms  QT Interval= 367  ms  QRS Axis= 48  deg  T Wave Axis= 32  deg  - NORMAL ECG -  Sinus rhythm  Electronically Signed By:   Date and Time of Study: 2020-04-03 19:58:02               Results for orders placed during the hospital encounter of 01/28/20   Adult Transthoracic Echo Complete W/ Cont if Necessary Per Protocol    Narrative · Left ventricular systolic function is normal.  · Right ventricular cavity is mildly dilated.  · Left atrial cavity size is moderately dilated.  · There is calcification of the aortic valve.  · Mild aortic valve regurgitation is present.  · Mild to moderate aortic valve stenosis is present.  · Mild tricuspid valve regurgitation is present.  · LV ejection fraction is about 55 to 60%  · No pericardial effusion noted          Xr Chest 1 View    Result Date: 4/3/2020  1. There is borderline cardiomegaly. There are chronic changes in the lungs. No acute process demonstrated.  Electronically Signed By-Bibiana Moya On:4/3/2020 9:35 PM This report was finalized on [id number] by  Bibiana Moya, .        Estimated Creatinine Clearance: 41.4 mL/min (A) (by C-G formula based on SCr of 1.18 mg/dL (H)).    Assessment/Plan   Assessment/Plan       Active Hospital Problems    Diagnosis  POA   • Dyspnea on exertion [R06.09]  Yes     Priority: High   • History of mitral valve replacement with bioprosthetic valve [Z95.3] [Z95.3]  Not Applicable     Priority: Medium   • Hyperlipidemia [E78.5]  Yes     Priority: Medium   • Hypertensive pulmonary vascular disease (CMS/HCC) [I27.20]  Yes     Priority: Medium   • Hypothyroidism [E03.9]  Yes     Priority: Medium   • Atrial fibrillation (CMS/HCC) [I48.91] [I48.91]  Yes     Priority: Medium   • Long term (current) use of  anticoagulants [Z79.01] [Z79.01]  Not Applicable     Priority: Medium   • Chest pain [R07.9]  Yes     Priority: Medium   • Pulmonary hypertension, primary (CMS/HCC) [I27.0]  Yes     Priority: Medium      Resolved Hospital Problems   No resolved problems to display.     Dyspnea on exertion, palpitations  -Initial troponin negative  -Serial troponins  -Check TSH, B12, lipids  -EF 55 to 60% (echo dated 1/28/2020)  -Patient did take an extra metoprolol, EKG normal sinus rhythm  -Cardiac monitor  -EKG for rhythm changes, chest pain    Fever  -Patient swab COVID 19  -Follow swab  -Check LDH, procalcitonin, CRP, ferritin  -Patient in enhanced droplet contact isolation    Pulmonary hypertension  -Continue sildenafil    Atrial fibrillation  -Continue metoprolol, Coumadin    CAD  -Continue ASA, Bumex,    Hypothyroidism  -Continue level thyroxine    Anxiety  -Continue Lexapro, Xanax (INSPECT reviewed)    Hyperlipidemia  -Continue statin            VTE Prophylaxis -   Mechanical Order History:     None      Pharmalogical Order History:     Coumadin          CODE STATUS:    Code Status and Medical Interventions:   Ordered at: 04/04/20 0024     Level Of Support Discussed With:    Patient     Code Status:    CPR     Medical Interventions (Level of Support Prior to Arrest):    Full       This patient has been examined wearing appropriate Personal Protective Equipment . 04/04/20      I discussed the patient's findings and my recommendations with patient.        Electronically signed by LEDY Cuellar, 04/03/20, 11:53 PM.  [facility]ist Team

## 2020-04-04 NOTE — PROGRESS NOTES
[facility]  INPATIENT PROGRESS NOTE    Hospitalist Team  LOS 0 days      Patient Care Team:  Jonelle Medina APRN as PCP - General  Simon Day MD as Consulting Physician (Cardiology)      Patient was examined with relevant and adequate PPE keeping in mind the current coronavirus pandemic.    Chief Complaint / Subjective    Chief Complaint   Patient presents with   • Shortness of Breath       HPI  Ms. Benson is a 70 y.o.  female with a history of atrial fibrillation, hyperlipidemia, pulmonary hypertension, hypothyroidism, mitral valve replacement presented to the [facility] ED on 4/3/2020 with a complaint of dyspnea upon exertion, shortness of breath, heart palpitations and a fever earlier in the day today.  Patient states her shortness of breath is worse when she gets up and moves around and walks.  Patient also states her palpitations are worse with exertion, she can feel her heart race when she gets up and walks.  Patient took an extra metoprolol before arriving at the ED.  Patient denies cough, body aches, nausea, vomiting, diarrhea.  Patient is currently living with her son and granddaughter.  Son recently laid off from   [employer].  However, granddaughter is still working at [employer].     In the ED, initial troponin negative, .3, potassium 3.7, BUN 22, creatinine 1.03, PT 18.5, INR 1.92, WBC 5.5, Hgb 10.4, HCT 30.9, blood cultures pending, respiratory panel negative,  EKG: Normal sinus rhythm.  Chest x-ray: Cardiomegaly, no active disease process.  Novel coronavirus pending.     Known tricuspid prosthetic valve.  On therapeutic anticoagulation.      Interval History and ROS:   (4 hpi elements or status of 3 chronic)        History taken from: patient    ROS   Review of Systems   Constitution: Positive for chills and fever.   HENT: Negative.    Eyes: Negative.    Cardiovascular: Positive for dyspnea on exertion.   Respiratory: Positive for shortness  "of breath.    Endocrine: Negative.    Hematologic/Lymphatic: Negative.    Skin: Negative.    Musculoskeletal: Negative.    Gastrointestinal: Negative.      Genitourinary: Negative.    Neurological: Negative.    Psychiatric/Behavioral: Negative.    Allergic/Immunologic: Negative.    All other systems reviewed and are negative.  Noted    Family History   Problem Relation Age of Onset   • Heart disease Father    • Heart disease Brother    • Heart disease Paternal Uncle        Past Medical History:   Diagnosis Date   • Atrial fibrillation (CMS/HCC)    • Hyperlipidemia    • Hypertension    • Sleep apnea    • Valvular disease        Social History     Socioeconomic History   • Marital status:      Spouse name: Not on file   • Number of children: Not on file   • Years of education: Not on file   • Highest education level: Not on file   Tobacco Use   • Smoking status: Never Smoker   • Smokeless tobacco: Never Used   Substance and Sexual Activity   • Alcohol use: Not Currently   • Drug use: Never   • Sexual activity: Defer         Objective    Physical Exam     Vital Signs  Temp:  [97.6 °F (36.4 °C)-99.8 °F (37.7 °C)] 97.7 °F (36.5 °C)  Heart Rate:  [71-92] 82  Resp:  [14-19] 15  BP: (101-142)/(58-96) 112/70    Oxygen Therapy  SpO2: 95 %  Pulse Oximetry Type: Intermittent  Device (Oxygen Therapy): room air  Flowsheet Rows      First Filed Value   Admission Height  160 cm (63\") Documented at 04/03/2020 1953   Admission Weight  72.6 kg (160 lb) Documented at 04/03/2020 1953        Intake & Output (last 3 days)     None        Lines, Drains & Airways    Active LDAs     Name:   Placement date:   Placement time:   Site:   Days:    Peripheral IV 04/03/20 2013 Left Antecubital   04/03/20 2013    Antecubital   less than 1                Physical Exam:    Physical Exam   Constitutional: She is oriented to person, place, and time. She appears well-developed and well-nourished. No distress.   HENT:   Head: Normocephalic and " atraumatic.   Right Ear: External ear normal.   Left Ear: External ear normal.   Nose: Nose normal.   Mouth/Throat: Oropharynx is clear and moist. No oropharyngeal exudate.   Eyes: Pupils are equal, round, and reactive to light. Conjunctivae and EOM are normal. Right eye exhibits no discharge. Left eye exhibits no discharge. No scleral icterus.   Neck: Normal range of motion. No JVD present. No tracheal deviation present. No thyromegaly present.   Cardiovascular: Normal rate, regular rhythm and intact distal pulses. Exam reveals no gallop and no friction rub.   Murmur heard.  Holosystolic murmur left sternal border 3 x 6   Pulmonary/Chest: Effort normal and breath sounds normal. No stridor. No respiratory distress. She has no wheezes. She has no rales. She exhibits no tenderness.   Abdominal: Soft. Bowel sounds are normal. She exhibits no distension and no mass. There is no tenderness. There is no rebound and no guarding. No hernia.   Musculoskeletal: Normal range of motion. She exhibits no edema, tenderness or deformity.   Lymphadenopathy:     She has no cervical adenopathy.   Neurological: She is alert and oriented to person, place, and time. No cranial nerve deficit or sensory deficit. She exhibits normal muscle tone. Coordination normal.   Skin: Skin is warm and dry. No rash noted. She is not diaphoretic. No erythema.   Psychiatric: She has a normal mood and affect. Her behavior is normal.   Nursing note and vitals reviewed.              Procedures:        Assessment/Plan with Problem wise         Suspected Covid-19 Virus Infection    Atrial fibrillation (CMS/HCC) [I48.91]    Long term (current) use of anticoagulants [Z79.01]    History of mitral valve replacement with bioprosthetic valve [Z95.3]    Hyperlipidemia    Hypothyroidism    Pulmonary hypertension, primary (CMS/HCC)    Iron deficiency anemia due to chronic blood loss       Resolved Hospital Problems:  No notes have been filed under this hospital  service.  Service: Hospitalist      Estimated Creatinine Clearance: 41.4 mL/min (A) (by C-G formula based on SCr of 1.18 mg/dL (H)).    Code Status and Medical Interventions:   Ordered at: 04/04/20 0024     Level Of Support Discussed With:    Patient     Code Status:    CPR     Medical Interventions (Level of Support Prior to Arrest):    Full       MEDICAL DECISION MAKING COMPLEXITY BY PROBLEM:     Covid19  Continue isolation till test reported   Supportive treatment  Infectious disease evaluation if test positive  Await confirmatory lab result  Oxygen supplementation as needed  Bronchodilators as needed  Chest x-ray and labs as needed  Steroids as needed        Atrial fibrillation:   Rate control  Beta-blockers  Calcium channel blockers  Digoxin  +/-Amiodarone  Anticoagulation  Close monitoring    Anticoagulation:  Subtherapeutic for prosthetic valve.  Initiate enoxaparin    Supportive treatment    Care coordination with nursing for current treatment.  Discussed with the patient.  She is in agreement.  04/04/20 1:24 PM.    Plan for disposition:            Destination      Coordination has not been started for this encounter.      Durable Medical Equipment      Coordination has not been started for this encounter.      Dialysis/Infusion      Coordination has not been started for this encounter.      Home Medical Care      Coordination has not been started for this encounter.      Therapy      Coordination has not been started for this encounter.      Community Resources      Coordination has not been started for this encounter.         Historical & Objective Data     Results Review:    I reviewed the patient's new lab and radiology results. 04/04/20     Results from last 7 days   Lab Units 04/04/20  0243 04/03/20 2016   WBC 10*3/mm3 5.70 5.50   HEMOGLOBIN g/dL 10.0* 10.4*   HEMATOCRIT % 28.9* 30.9*   MCV fL 81.3 80.6   MCH pg 28.0 27.0   MPV fL 9.6 9.3   RDW % 15.1 15.1   PLATELETS 10*3/mm3 210 236     Results from  last 7 days   Lab Units 04/04/20  0243 04/03/20 2016   SODIUM mmol/L 143 141   POTASSIUM mmol/L 3.8 3.7   CHLORIDE mmol/L 109* 107   CO2 mmol/L 25.0 22.0   BUN mg/dL 24* 22   CREATININE mg/dL 1.18* 1.03*   CALCIUM mg/dL 8.9 8.9   BILIRUBIN mg/dL  --  0.2   ALK PHOS U/L  --  78   ALT (SGPT) U/L  --  20   AST (SGOT) U/L  --  25   GLUCOSE mg/dL 107* 81     Results from last 7 days   Lab Units 04/04/20  0243   CRP mg/dL 0.29         No results found for: PHOS  No results found for: HGBA1C  Lab Results   Component Value Date    CHOL 144 04/04/2020    CHLPL 152 02/06/2020    TRIG 122 04/04/2020    HDL 57 04/04/2020    LDL 63 04/04/2020     No results found for: LIPASE  Results from last 7 days   Lab Units 04/04/20  0243 04/03/20 2016   INR  1.85* 1.92*           No results found for: INTRAOP, PREDX, FINALDX, COMDX  No results found for: COVID19     Microbiology Results (last 10 days)     Procedure Component Value - Date/Time    Respiratory Panel, PCR - Swab, Nasopharynx [[id number]]  (Normal) Collected:  04/03/20 2032    Lab Status:  Final result Specimen:  Swab from Nasopharynx Updated:  04/03/20 2211     ADENOVIRUS, PCR Not Detected     Coronavirus 229E Not Detected     Coronavirus HKU1 Not Detected     Coronavirus NL63 Not Detected     Coronavirus OC43 Not Detected     Human Metapneumovirus Not Detected     Human Rhinovirus/Enterovirus Not Detected     Influenza B PCR Not Detected     Parainfluenza Virus 1 Not Detected     Parainfluenza Virus 2 Not Detected     Parainfluenza Virus 3 Not Detected     Parainfluenza Virus 4 Not Detected     Bordetella pertussis pcr Not Detected     Influenza A H1 2009 PCR Not Detected     Chlamydophila pneumoniae PCR Not Detected     Mycoplasma pneumo by PCR Not Detected     Influenza A PCR Not Detected     Influenza A H3 Not Detected     Influenza A H1 Not Detected     RSV, PCR Not Detected    Narrative:       The coronavirus on the RVP is NOT COVID-19 and is NOT indicative of infection  with COVID-19.           ECG/EMG Results (most recent)     Procedure Component Value Units Date/Time    ECG 12 Lead [[id number]] Collected:  04/03/20 1958     Updated:  04/04/20 0608    Narrative:       HEART RATE= 86  bpm  RR Interval= 696  ms  WY Interval= 140  ms  P Horizontal Axis= 31  deg  P Front Axis= 64  deg  QRSD Interval= 94  ms  QT Interval= 367  ms  QRS Axis= 48  deg  T Wave Axis= 32  deg  - NORMAL ECG -  Sinus rhythm  When compared with ECG of 12-Sep-2019 7:25:18,  Significant rate increase  Electronically Signed By: Paddy Duran (MARIE) 04-Apr-2020 06:08:21  Date and Time of Study: 2020-04-03 19:58:02    ECG 12 Lead [[id number]] Collected:  04/04/20 0832     Updated:  04/04/20 0835    Narrative:       HEART RATE= 73  bpm  RR Interval= 820  ms  WY Interval= 142  ms  P Horizontal Axis= 44  deg  P Front Axis= 71  deg  QRSD Interval= 92  ms  QT Interval= 400  ms  QRS Axis= 58  deg  T Wave Axis= 41  deg  - NORMAL ECG -  Sinus rhythm  When compared with ECG of 03-Apr-2020 19:58:02,  No significant change  Electronically Signed By:   Date and Time of Study: 2020-04-04 08:32:22               Results for orders placed during the hospital encounter of 01/28/20   Adult Transthoracic Echo Complete W/ Cont if Necessary Per Protocol    Narrative · Left ventricular systolic function is normal.  · Right ventricular cavity is mildly dilated.  · Left atrial cavity size is moderately dilated.  · There is calcification of the aortic valve.  · Mild aortic valve regurgitation is present.  · Mild to moderate aortic valve stenosis is present.  · Mild tricuspid valve regurgitation is present.  · LV ejection fraction is about 55 to 60%  · No pericardial effusion noted          Xr Chest 1 View    Result Date: 4/3/2020  1. There is borderline cardiomegaly. There are chronic changes in the lungs. No acute process demonstrated.  Electronically Signed By-Bibiana Moya On:4/3/2020 9:35 PM This report was finalized on [id number] by   Bibiana Moya, .      I personally reviewed patient's x-ray films and my findings are: 0    I personally reviewed patient's EKG strip and my findings are: 0    Medication Review:   I have reviewed the patient's current medication list 04/04/20     Scheduled Meds    aspirin 81 mg Oral Daily   bumetanide 1 mg Oral Daily   cetirizine 10 mg Oral Daily   enoxaparin 1 mg/kg Subcutaneous Q12H   escitalopram 20 mg Oral Daily   iron sucrose 400 mg Intravenous Once   levothyroxine 137 mcg Oral Daily   Macitentan 10 mg Oral Daily   metoprolol succinate XL 25 mg Oral Daily   multivitamin (eye vitamin) 1 tablet Oral Daily   pantoprazole 40 mg Oral Daily   rosuvastatin 2.5 mg Oral Nightly   sildenafil 20 mg Oral TID   warfarin 3 mg Oral Daily       Meds Infusions    Pharmacy to dose warfarin        DVT prophylaxis  Mechanical Order History:     None      Pharmalogical Order History:     Ordered     Dose Route Frequency Stop    04/04/20 1322  enoxaparin (LOVENOX) syringe 70 mg     Note to Pharmacy:  Prosthetic valve    1 mg/kg SC Every 12 Hours --    04/04/20 0022  warfarin (COUMADIN) tablet 3 mg     Question:  Target INR  Answer:  2 - 3    3 mg PO Daily Warfarin --    04/04/20 0022  Pharmacy to dose warfarin     Question:  Target INR  Answer:  2 - 3    -- XX Continuous PRN --          Meds PRN  •  acetaminophen **OR** acetaminophen **OR** acetaminophen  •  ALPRAZolam  •  bisacodyl  •  magnesium hydroxide  •  melatonin  •  ondansetron **OR** ondansetron  •  Pharmacy to dose warfarin      Lebron Lancaster MD  04/04/20  13:31      MDM

## 2020-04-04 NOTE — PROGRESS NOTES
"Pharmacy dosing service  Anticoagulant  Warfarin     Subjective:    Aleena Benson is a 70 y.o.female being continued on warfarin for atrial fibrillation.    INR Goal: 2 - 3  Home medication?: Yes, warfarin 3 mg PO every day at 1800  Bridge Therapy Present?:  No  Interacting Medications Evaluation (New/Present/Discontinued): Zithromax      Assessment/Plan:    Will continue with home dose of 3mg.  I will include a dose this am since dose was not documented for 4/3.  INR slightly subtherapeutic, but the combination of the dose now and again at 1800 and addition of azithromycin, the INR should come to therapeutic range.     Continue to monitor and adjust based on INR.         Date 4/4 (0040)           INR 1.92           Dose 3mg               Objective:  [Ht: 160 cm (63\"); Wt: 71.3 kg (157 lb 3 oz); BMI: Body mass index is 27.84 kg/m².]  Lab Results   Component Value Date    ALBUMIN 3.70 04/03/2020     Lab Results   Component Value Date    INR 1.92 (L) 04/03/2020    INR 2.20 (A) 03/03/2020    INR 1.90 (A) 01/28/2020    PROTIME 18.5 (L) 04/03/2020    PROTIME 13.0 (L) 09/12/2019    PROTIME 16.2 (L) 02/17/2017     Lab Results   Component Value Date    HGB 10.4 (L) 04/03/2020    HGB 10.9 (L) 09/12/2019    HGB 11.5 (L) 12/06/2018     Lab Results   Component Value Date    HCT 30.9 (L) 04/03/2020    HCT 33.0 (L) 09/12/2019    HCT 35.4 12/06/2018       Dank Jama, PharmD  04/04/20 00:37     "

## 2020-04-04 NOTE — PROGRESS NOTES
"Pharmacy dosing service  Anticoagulant  Warfarin     Subjective:    Aleena Benson is a 70 y.o.female being continued on warfarin for atrial fibrillation.    INR Goal: 2 - 3  Home medication?: Yes, warfarin 3 mg PO every day at 1800  Bridge Therapy Present?:  No  Interacting Medications Evaluation (New/Present/Discontinued): Zithromax      Assessment/Plan:    Will continue with home dose of 3mg.      Continue to monitor and adjust based on INR.         Date 4/4 (0040) 4/4          INR 1.92 1.85          Dose 0mg- pt refused 3mg              Objective:  [Ht: 160 cm (63\"); Wt: 71.3 kg (157 lb 3 oz); BMI: Body mass index is 27.84 kg/m².]  Lab Results   Component Value Date    ALBUMIN 3.70 04/03/2020     Lab Results   Component Value Date    INR 1.92 (L) 04/03/2020    INR 2.20 (A) 03/03/2020    INR 1.90 (A) 01/28/2020    PROTIME 18.5 (L) 04/03/2020    PROTIME 13.0 (L) 09/12/2019    PROTIME 16.2 (L) 02/17/2017     Lab Results   Component Value Date    HGB 10.4 (L) 04/03/2020    HGB 10.9 (L) 09/12/2019    HGB 11.5 (L) 12/06/2018     Lab Results   Component Value Date    HCT 30.9 (L) 04/03/2020    HCT 33.0 (L) 09/12/2019    HCT 35.4 12/06/2018     Kavita MccabeD, BCPS        "

## 2020-04-04 NOTE — PLAN OF CARE
Problem: Patient Care Overview  Goal: Plan of Care Review  Outcome: Ongoing (interventions implemented as appropriate)  Flowsheets (Taken 4/4/2020 0339)  Progress: improving  Plan of Care Reviewed With: patient  Outcome Summary: Pt newly admitted to unit. Pt denies any chest pain and only has SOB while ambulating to the bathroom. Resting in bed at this time will continue to monitor.

## 2020-04-05 VITALS
HEART RATE: 69 BPM | RESPIRATION RATE: 14 BRPM | HEIGHT: 63 IN | TEMPERATURE: 98.2 F | OXYGEN SATURATION: 94 % | BODY MASS INDEX: 27.42 KG/M2 | WEIGHT: 154.76 LBS | SYSTOLIC BLOOD PRESSURE: 99 MMHG | DIASTOLIC BLOOD PRESSURE: 61 MMHG

## 2020-04-05 LAB
HEMOCCULT STL QL IA: NEGATIVE
INR PPP: 2.18 (ref 2–3)
PROTHROMBIN TIME: 20.8 SECONDS (ref 19.4–28.5)

## 2020-04-05 PROCEDURE — 25010000002 ENOXAPARIN PER 10 MG: Performed by: INTERNAL MEDICINE

## 2020-04-05 PROCEDURE — 99217 PR OBSERVATION CARE DISCHARGE MANAGEMENT: CPT | Performed by: INTERNAL MEDICINE

## 2020-04-05 PROCEDURE — 96372 THER/PROPH/DIAG INJ SC/IM: CPT

## 2020-04-05 PROCEDURE — 85610 PROTHROMBIN TIME: CPT | Performed by: NURSE PRACTITIONER

## 2020-04-05 PROCEDURE — G0378 HOSPITAL OBSERVATION PER HR: HCPCS

## 2020-04-05 PROCEDURE — 82274 ASSAY TEST FOR BLOOD FECAL: CPT | Performed by: INTERNAL MEDICINE

## 2020-04-05 RX ADMIN — METOPROLOL SUCCINATE 25 MG: 25 TABLET, EXTENDED RELEASE ORAL at 10:11

## 2020-04-05 RX ADMIN — PANTOPRAZOLE SODIUM 40 MG: 40 TABLET, DELAYED RELEASE ORAL at 10:09

## 2020-04-05 RX ADMIN — MACITENTAN 10 MG: 10 TABLET, FILM COATED ORAL at 10:12

## 2020-04-05 RX ADMIN — ASPIRIN 81 MG 81 MG: 81 TABLET ORAL at 10:11

## 2020-04-05 RX ADMIN — LEVOTHYROXINE SODIUM 137 MCG: 25 TABLET ORAL at 10:09

## 2020-04-05 RX ADMIN — MULTIPLE VITAMINS W/ MINERALS TAB 1 TABLET: TAB at 10:11

## 2020-04-05 RX ADMIN — ESCITALOPRAM OXALATE 20 MG: 10 TABLET ORAL at 10:10

## 2020-04-05 RX ADMIN — SILDENAFIL 20 MG: 20 TABLET, FILM COATED ORAL at 10:11

## 2020-04-05 RX ADMIN — CETIRIZINE HYDROCHLORIDE 10 MG: 10 TABLET, FILM COATED ORAL at 10:11

## 2020-04-05 RX ADMIN — ENOXAPARIN SODIUM 70 MG: 80 INJECTION SUBCUTANEOUS at 03:06

## 2020-04-05 RX ADMIN — BUMETANIDE 1 MG: 1 TABLET ORAL at 10:10

## 2020-04-05 NOTE — PLAN OF CARE
Problem: Patient Care Overview  Goal: Plan of Care Review  Outcome: Ongoing (interventions implemented as appropriate)  Flowsheets  Taken 4/5/2020 0348 by Anhai Fairchild RN  Progress: improving  Outcome Summary: Pt resting comfortably in bed with no complaints. Will continue to monitor.  Taken 4/4/2020 0850 by Sabino Ribera LPN  Plan of Care Reviewed With: patient

## 2020-04-05 NOTE — DISCHARGE SUMMARY
Date of Admission: 4/3/2020    Date of Discharge:  4/5/2020    Length of stay:  LOS: 0 days     Patient was examined with relevant and adequate PPE keeping in mind the current coronavirus pandemic.      Presenting Problem/History of Present Illness   Present on Admission:  • Atrial fibrillation (CMS/HCC) [I48.91]  • Hyperlipidemia  • Hypothyroidism  • Pulmonary hypertension, primary (CMS/HCC)  • Suspected Covid-19 Virus Infection  • Iron deficiency anemia due to chronic blood loss        Hospital Course    Chief Complaint   Patient presents with   • Shortness of Breath       Aleena Benson 70 y.o. female.      Ms. Benson is a 70 y.o.  female with a history of atrial fibrillation, hyperlipidemia, pulmonary hypertension, hypothyroidism, mitral valve replacement presented to the [facility] ED on 4/3/2020 with a complaint of dyspnea upon exertion, shortness of breath, heart palpitations and a fever earlier in the day today.  Patient states her shortness of breath is worse when she gets up and moves around and walks.  Patient also states her palpitations are worse with exertion, she can feel her heart race when she gets up and walks.  Patient took an extra metoprolol before arriving at the ED.  Patient denies cough, body aches, nausea, vomiting, diarrhea.  Patient is currently living with her son and granddaughter.  Son recently laid off from   [employer].  However, granddaughter is still working at [employer].     In the ED, initial troponin negative, .3, potassium 3.7, BUN 22, creatinine 1.03, PT 18.5, INR 1.92, WBC 5.5, Hgb 10.4, HCT 30.9, blood cultures pending, respiratory panel negative,  EKG: Normal sinus rhythm.  Chest x-ray: Cardiomegaly, no active disease process.  Novel coronavirus pending.     Patient was observed through the hospital stay.  Remained stable.  Clarified again today the reason for anticoagulation.  She has a porcine valve.  Hence anticoagulation for atrial fibrillation.  INR  range to be 2-3.  PCP to follow the results of covid 19 in 1 to 2 days and conveyed to the patient.  Also PCP to work-up her iron deficiency anemia.  Stool Hemoccult is negative.  I would suggest colonoscopy if she has not had it in the last 5 years.    ROS   Review of Systems   Constitution: Positive for chills and fever.   HENT: Negative.    Eyes: Negative.    Cardiovascular: Positive for dyspnea on exertion.   Respiratory: Positive for shortness of breath.    Endocrine: Negative.    Hematologic/Lymphatic: Negative.    Skin: Negative.    Musculoskeletal: Negative.    Gastrointestinal: Negative.    Genitourinary: Negative.    Neurological: Negative.    Psychiatric/Behavioral: Negative.    Allergic/Immunologic: Negative.    All other systems reviewed and are negative.  No change    Family History   Problem Relation Age of Onset   • Heart disease Father    • Heart disease Brother    • Heart disease Paternal Uncle         Past Medical History:   Diagnosis Date   • Atrial fibrillation (CMS/HCC)    • Hyperlipidemia    • Hypertension    • Sleep apnea    • Valvular disease        Past Surgical History:   Procedure Laterality Date   • ABDOMINAL SURGERY  2016    gastric bypass   • ADENOIDECTOMY     • BLADDER SURGERY      bladder sling   • CARDIAC CATHETERIZATION  2013 2015, 2017   • CHOLECYSTECTOMY     • HYSTERECTOMY     • MITRAL VALVE REPLACEMENT  2013   • THYROID SURGERY     • TONSILLECTOMY     • TRICUSPID VALVE REPLACEMENT  2013       Social History     Socioeconomic History   • Marital status:      Spouse name: Not on file   • Number of children: Not on file   • Years of education: Not on file   • Highest education level: Not on file   Tobacco Use   • Smoking status: Never Smoker   • Smokeless tobacco: Never Used   Substance and Sexual Activity   • Alcohol use: Not Currently   • Drug use: Never   • Sexual activity: Defer       Vital Signs  Temp:  [98.1 °F (36.7 °C)-98.6 °F (37 °C)] 98.2 °F (36.8 °C)  Heart  Rate:  [63-86] 69  Resp:  [14-17] 14  BP: ()/(55-70) 99/61    Physical Exam:  Physical Exam  Physical Exam   Constitutional: She is oriented to person, place, and time. She appears well-developed and well-nourished. No distress.   HENT:   Head: Normocephalic and atraumatic.   Right Ear: External ear normal.   Left Ear: External ear normal.   Nose: Nose normal.   Mouth/Throat: Oropharynx is clear and moist. No oropharyngeal exudate.   Eyes: Pupils are equal, round, and reactive to light. Conjunctivae and EOM are normal. Right eye exhibits no discharge. Left eye exhibits no discharge. No scleral icterus.   Neck: Normal range of motion. No JVD present. No tracheal deviation present. No thyromegaly present.   Cardiovascular: Normal rate, regular rhythm and intact distal pulses. Exam reveals no gallop and no friction rub.   Murmur heard.  Holosystolic murmur left sternal border 3 x 6   Pulmonary/Chest: Effort normal and breath sounds normal. No stridor. No respiratory distress. She has no wheezes. She has no rales. She exhibits no tenderness.   Abdominal: Soft. Bowel sounds are normal. She exhibits no distension and no mass. There is no tenderness. There is no rebound and no guarding. No hernia.   Musculoskeletal: Normal range of motion. She exhibits no edema, tenderness or deformity.   Lymphadenopathy:     She has no cervical adenopathy.   Neurological: She is alert and oriented to person, place, and time. No cranial nerve deficit or sensory deficit. She exhibits normal muscle tone. Coordination normal.   Skin: Skin is warm and dry. No rash noted. She is not diaphoretic. No erythema.   Psychiatric: She has a normal mood and affect. Her behavior is normal.   Nursing note and vitals reviewed.   No change from April 4             Discharge Diagnosis:     Active Hospital Problems    Diagnosis  POA   • **Suspected Covid-19 Virus Infection [R68.89]  Yes     Priority: High   • History of mitral valve replacement with  bioprosthetic valve [Z95.3] [Z95.3]  Not Applicable     Priority: Medium   • Atrial fibrillation (CMS/HCC) [I48.91] [I48.91]  Yes     Priority: Medium   • Long term (current) use of anticoagulants for atrial fibrillation [Z79.01]  Not Applicable     Priority: Medium   • Iron deficiency anemia due to chronic blood loss [D50.0]  Yes   • Hyperlipidemia [E78.5]  Yes   • Hypothyroidism [E03.9]  Yes   • Pulmonary hypertension, primary (CMS/HCC) [I27.0]  Yes      Resolved Hospital Problems   No resolved problems to display.       Estimated Creatinine Clearance: 41.1 mL/min (A) (by C-G formula based on SCr of 1.18 mg/dL (H)).    Discharge Disposition    Destination      Coordination has not been started for this encounter.      Durable Medical Equipment      Coordination has not been started for this encounter.      Dialysis/Infusion      Coordination has not been started for this encounter.      Home Medical Care      Coordination has not been started for this encounter.      Therapy      Coordination has not been started for this encounter.      Community Resources      Coordination has not been started for this encounter.              PT Recommendation and Plan          Home or Self Care           Discharge Medications      Continue These Medications      Instructions Start Date   ALPRAZolam 0.5 MG tablet  Commonly known as:  XANAX   0.5 mg, Oral, 2 Times Daily PRN      aspirin 81 MG tablet   81 mg, Oral, Daily      bumetanide 1 MG tablet  Commonly known as:  BUMEX   1 mg, Oral, Daily      Calcium Carb-Cholecalciferol 500-400 MG-UNIT chewable tablet   1 tablet, Oral, Daily      Centrum Silver tablet   1 tablet, Oral, Daily      fluticasone 50 MCG/ACT nasal spray  Commonly known as:  FLONASE   1 spray, Nasal, Daily      Lexapro 20 MG tablet  Generic drug:  escitalopram   20 mg, Oral, Daily      metoprolol succinate XL 25 MG 24 hr tablet  Commonly known as:  TOPROL-XL   25 mg, Oral, Daily      omeprazole 40 MG  capsule  Commonly known as:  priLOSEC   40 mg, Oral, Daily      Opsumit 10 MG tablet  Generic drug:  Macitentan   10 mg, Oral, Daily      PROLIA SC   60 mg, Injection, Every 6 Months      Revatio 20 MG tablet  Generic drug:  sildenafil   20 mg, Oral, 3 Times Daily      rosuvastatin 5 MG tablet  Commonly known as:  CRESTOR   2.5 mg, Oral, Daily      Synthroid 137 MCG tablet  Generic drug:  levothyroxine   137 mcg, Oral, Daily      Vitamin D-3 25 MCG (1000 UT) capsule   1,000 Units, Oral, Daily      warfarin 3 MG tablet  Commonly known as:  COUMADIN   3 mg, Oral, Daily      ZyrTEC Allergy 10 MG tablet  Generic drug:  cetirizine   10 mg, Oral, Daily           Discharge medications personally reviewed by me and med rec done by me personally.  04/05/20, 11:53 AM        Consults:   Consults     No orders found from 3/5/2020 to 4/4/2020.          Procedures Performed:         Pertinent Test Results:   Results from last 7 days   Lab Units 04/04/20 0243 04/03/20 2016   WBC 10*3/mm3 5.70 5.50   HEMOGLOBIN g/dL 10.0* 10.4*   HEMATOCRIT % 28.9* 30.9*   MCV fL 81.3 80.6   MCH pg 28.0 27.0   PLATELETS 10*3/mm3 210 236     Results from last 7 days   Lab Units 04/04/20 0243 04/03/20 2016   SODIUM mmol/L 143 141   POTASSIUM mmol/L 3.8 3.7   CHLORIDE mmol/L 109* 107   CO2 mmol/L 25.0 22.0   BUN mg/dL 24* 22   CREATININE mg/dL 1.18* 1.03*   CALCIUM mg/dL 8.9 8.9   BILIRUBIN mg/dL  --  0.2   ALK PHOS U/L  --  78   ALT (SGPT) U/L  --  20   AST (SGOT) U/L  --  25   GLUCOSE mg/dL 107* 81         Lab Results   Component Value Date    CALCIUM 8.9 04/04/2020     No results found for: HGBA1C  Lab Results   Component Value Date    CHOL 144 04/04/2020    CHLPL 152 02/06/2020    TRIG 122 04/04/2020    HDL 57 04/04/2020    LDL 63 04/04/2020     No results found for: LIPASE        No results found for: INTRAOP, PREDX, FINALDX, COMDX  No results found for: COVID19     Microbiology Results (last 10 days)     Procedure Component Value - Date/Time     Blood Culture - Blood, Arm, Right [[id number]] Collected:  04/03/20 2041    Lab Status:  Preliminary result Specimen:  Blood from Arm, Right Updated:  04/04/20 2100     Blood Culture No growth at 24 hours    Blood Culture - Blood, Arm, Left [[id number]] Collected:  04/03/20 2041    Lab Status:  Preliminary result Specimen:  Blood from Arm, Left Updated:  04/04/20 2100     Blood Culture No growth at 24 hours    Respiratory Panel, PCR - Swab, Nasopharynx [[id number]]  (Normal) Collected:  04/03/20 2032    Lab Status:  Final result Specimen:  Swab from Nasopharynx Updated:  04/03/20 2211     ADENOVIRUS, PCR Not Detected     Coronavirus 229E Not Detected     Coronavirus HKU1 Not Detected     Coronavirus NL63 Not Detected     Coronavirus OC43 Not Detected     Human Metapneumovirus Not Detected     Human Rhinovirus/Enterovirus Not Detected     Influenza B PCR Not Detected     Parainfluenza Virus 1 Not Detected     Parainfluenza Virus 2 Not Detected     Parainfluenza Virus 3 Not Detected     Parainfluenza Virus 4 Not Detected     Bordetella pertussis pcr Not Detected     Influenza A H1 2009 PCR Not Detected     Chlamydophila pneumoniae PCR Not Detected     Mycoplasma pneumo by PCR Not Detected     Influenza A PCR Not Detected     Influenza A H3 Not Detected     Influenza A H1 Not Detected     RSV, PCR Not Detected    Narrative:       The coronavirus on the RVP is NOT COVID-19 and is NOT indicative of infection with COVID-19.           ECG/EMG Results (most recent)     Procedure Component Value Units Date/Time    ECG 12 Lead [[id number]] Collected:  04/03/20 1958     Updated:  04/04/20 0608    Narrative:       HEART RATE= 86  bpm  RR Interval= 696  ms  RI Interval= 140  ms  P Horizontal Axis= 31  deg  P Front Axis= 64  deg  QRSD Interval= 94  ms  QT Interval= 367  ms  QRS Axis= 48  deg  T Wave Axis= 32  deg  - NORMAL ECG -  Sinus rhythm  When compared with ECG of 12-Sep-2019 7:25:18,  Significant rate  increase  Electronically Signed By: Paddy Duran (MARIE) 04-Apr-2020 06:08:21  Date and Time of Study: 2020-04-03 19:58:02    ECG 12 Lead [[id number]] Collected:  04/04/20 0832     Updated:  04/04/20 0835    Narrative:       HEART RATE= 73  bpm  RR Interval= 820  ms  DE Interval= 142  ms  P Horizontal Axis= 44  deg  P Front Axis= 71  deg  QRSD Interval= 92  ms  QT Interval= 400  ms  QRS Axis= 58  deg  T Wave Axis= 41  deg  - NORMAL ECG -  Sinus rhythm  When compared with ECG of 03-Apr-2020 19:58:02,  No significant change  Electronically Signed By:   Date and Time of Study: 2020-04-04 08:32:22               Results for orders placed during the hospital encounter of 01/28/20   Adult Transthoracic Echo Complete W/ Cont if Necessary Per Protocol    Narrative · Left ventricular systolic function is normal.  · Right ventricular cavity is mildly dilated.  · Left atrial cavity size is moderately dilated.  · There is calcification of the aortic valve.  · Mild aortic valve regurgitation is present.  · Mild to moderate aortic valve stenosis is present.  · Mild tricuspid valve regurgitation is present.  · LV ejection fraction is about 55 to 60%  · No pericardial effusion noted          Xr Chest 1 View    Result Date: 4/3/2020  1. There is borderline cardiomegaly. There are chronic changes in the lungs. No acute process demonstrated.  Electronically Signed By-Bibiana Moya On:4/3/2020 9:35 PM This report was finalized on [id number] by  Bibiana oMya, .      Xrays, labs reviewed personally by me.  04/05/20  11:53 AM      Condition on Discharge:    Stable    Discharge Diet:   Dietary Orders (From admission, onward)     Start     Ordered    04/04/20 0959  Diet Regular  Diet Effective Now     Question:  Diet / Texture / Consistency  Answer:  Regular    04/04/20 0959                Activity at Discharge:   Activity Instructions     Activity as Tolerated            Follow-up Appointments  Future Appointments   Date Time Provider  Department Center   4/14/2020  9:30 AM RAMESH, [facility] [place] [facility]   7/6/2020  1:40 PM Simon Day MD [facility]     Additional Instructions for the Follow-ups that You Need to Schedule     Discharge Follow-up with PCP   As directed       Currently Documented PCP:    Jonelle Medina APRN    PCP Phone Number:    [phone]     Follow Up Details:  If no PCP, call MD finder at [phone]         Discharge Follow-up with Specified Provider: Cardiology; 1 Month   As directed      To:  Cardiology    Follow Up:  1 Month               Test Results Pending at Discharge   Order Current Status    CORONAVIRUS-19(COVID-19),RT-PCR,[facility], NP Swab in Transport Media - Swab, Nasopharynx In process    Blood Culture - Blood, Arm, Left Preliminary result    Blood Culture - Blood, Arm, Right Preliminary result           Risk for Readmission (LACE) Score: 4 (4/5/2020  6:00 AM)          Lebron Lancaster MD  04/05/20  11:53

## 2020-04-05 NOTE — PROGRESS NOTES
Continued Stay Note  [facility]     Patient Name: Aleena Benson  MRN: [id number]  Today's Date: 4/5/2020    Admit Date: 4/3/2020    Discharge Plan     Row Name 04/05/20 1710       Plan    Final Discharge Disposition Code  01 - home or self-care              Expected Discharge Date and Time     Expected Discharge Date Expected Discharge Time    Apr 5, 2020             Makenna Leger RN

## 2020-04-05 NOTE — SIGNIFICANT NOTE
04/05/20 1229   Discharge of Care   Discharge Mode wheel chair   Discharge Destination home   Discharged Accompanied by family member/friend   Discharge Teaching Done  Yes   Learning Method Explanation;Written Materials     Patient being discharged home with two weeks self monitored quarantined.  Patient's son is transporting her home.

## 2020-04-05 NOTE — PROGRESS NOTES
"Pharmacy dosing service  Anticoagulant  Warfarin     Subjective:    Aleena Benson is a 70 y.o.female being continued on warfarin for atrial fibrillation and mitral valve replacement.    INR Goal: 2.5-3.5  Home medication?: Yes, warfarin 3 mg PO every day at 1800  Bridge Therapy Present?:  yes        Assessment/Plan:    Will continue with home dose of 3mg.  Treatment lovenox 70 mg BID added per Dr Lancaster for indication of mechanical valve with goal INR 2.5-3.5.    Continue to monitor and adjust based on INR.         Date 4/4 (0040) 4/4 4/5         INR 1.92 1.85 2.18         Dose 0mg- pt refused 3mg 3mg             Objective:  [Ht: 160 cm (63\"); Wt: 71.3 kg (157 lb 3 oz); BMI: Body mass index is 27.84 kg/m².]  Lab Results   Component Value Date    ALBUMIN 3.70 04/03/2020     Lab Results   Component Value Date    INR 1.92 (L) 04/03/2020    INR 2.20 (A) 03/03/2020    INR 1.90 (A) 01/28/2020    PROTIME 18.5 (L) 04/03/2020    PROTIME 13.0 (L) 09/12/2019    PROTIME 16.2 (L) 02/17/2017     Lab Results   Component Value Date    HGB 10.4 (L) 04/03/2020    HGB 10.9 (L) 09/12/2019    HGB 11.5 (L) 12/06/2018     Lab Results   Component Value Date    HCT 30.9 (L) 04/03/2020    HCT 33.0 (L) 09/12/2019    HCT 35.4 12/06/2018     Kavita Davis PharmD, BCPS          "

## 2020-04-06 ENCOUNTER — READMISSION MANAGEMENT (OUTPATIENT)
Dept: CALL CENTER | Facility: HOSPITAL | Age: 70
End: 2020-04-06

## 2020-04-06 NOTE — OUTREACH NOTE
Prep Survey      Responses   [facility] discharged from?  [facility]   Is LACE score < 7 ?  No   Eligibility  Readm Mgmt   Discharge diagnosis  Afib, pulmonary hypertension, covid negative   Does the patient have one of the following disease processes/diagnoses(primary or secondary)?  Other   Does the patient have Home health ordered?  No   Is there a DME ordered?  No   Prep survey completed?  Yes          Jaky Gil RN

## 2020-04-06 NOTE — OUTREACH NOTE
Medical Week 1 Survey      Responses   [facility] patient discharged from?  [facility]   COVID-19 Test Status  Negative   Does the patient have one of the following disease processes/diagnoses(primary or secondary)?  Other   Is there a successful TCM telephone encounter documented?  No   Week 1 attempt successful?  Yes   Call start time  1549   Call end time  1554   Meds reviewed with patient/caregiver?  Yes   Is the patient having any side effects they believe may be caused by any medication additions or changes?  No   Does the patient have all medications ordered at discharge?  N/A   Is the patient taking all medications as directed (includes completed medication regime)?  Yes   Does the patient have a primary care provider?   Yes   Does the patient have an appointment with their PCP within 7 days of discharge?  No   What is preventing the patient from scheduling follow up appointments within 7 days of discharge?  Haven't had time   Nursing Interventions  Advised patient to make appointment   Has the patient kept scheduled appointments due by today?  N/A   Comments  States will call PCP for phone consult.   Has home health visited the patient within 72 hours of discharge?  N/A   Psychosocial issues?  No   Did the patient receive a copy of their discharge instructions?  Yes   Nursing interventions  Reviewed instructions with patient, Educated on MyChart   What is the patient's perception of their health status since discharge?  Improving   Is the patient/caregiver able to teach back signs and symptoms related to disease process for when to call PCP?  Yes   Is the patient/caregiver able to teach back signs and symptoms related to disease process for when to call 911?  Yes   Is the patient/caregiver able to teach back the hierarchy of who to call/visit for symptoms/problems? PCP, Specialist, Home health nurse, Urgent Care, ED, 911  Yes   Additional teach back comments  Reviewed s/s of worsening lung infection.    Week 1 call completed?  Yes   Wrap up additional comments  States is feeling better-denies any fever or SOA. No complaints today.          Sarina Macario RN

## 2020-04-07 LAB
REF LAB TEST METHOD: NORMAL
SARS-COV-2 RNA RESP QL NAA+PROBE: NOT DETECTED

## 2020-04-08 LAB
BACTERIA SPEC AEROBE CULT: NORMAL
BACTERIA SPEC AEROBE CULT: NORMAL

## 2020-04-13 ENCOUNTER — READMISSION MANAGEMENT (OUTPATIENT)
Dept: CALL CENTER | Facility: HOSPITAL | Age: 70
End: 2020-04-13

## 2020-04-13 NOTE — OUTREACH NOTE
Medical Week 2 Survey      Responses   [facility] discharged from?  [facility]   COVID-19 Test Status  Negative   Does the patient have one of the following disease processes/diagnoses(primary or secondary)?  Other   Week 2 attempt successful?  Yes   Discharge diagnosis  Afib, pulmonary hypertension, covid negative   Rescheduled  Rescheduled-pt requested [sister states best number is pt's home or mobile #]          Frances Shah RN

## 2020-04-14 ENCOUNTER — ANTICOAGULATION VISIT (OUTPATIENT)
Dept: CARDIOLOGY | Facility: CLINIC | Age: 70
End: 2020-04-14

## 2020-04-14 ENCOUNTER — READMISSION MANAGEMENT (OUTPATIENT)
Dept: CALL CENTER | Facility: HOSPITAL | Age: 70
End: 2020-04-14

## 2020-04-14 ENCOUNTER — TELEPHONE (OUTPATIENT)
Dept: CARDIOLOGY | Facility: CLINIC | Age: 70
End: 2020-04-14

## 2020-04-14 VITALS
DIASTOLIC BLOOD PRESSURE: 74 MMHG | WEIGHT: 162 LBS | SYSTOLIC BLOOD PRESSURE: 142 MMHG | BODY MASS INDEX: 28.7 KG/M2 | HEART RATE: 70 BPM

## 2020-04-14 DIAGNOSIS — I48.91 ATRIAL FIBRILLATION, UNSPECIFIED TYPE (HCC): ICD-10-CM

## 2020-04-14 DIAGNOSIS — Z95.3 HISTORY OF MITRAL VALVE REPLACEMENT WITH BIOPROSTHETIC VALVE: ICD-10-CM

## 2020-04-14 DIAGNOSIS — Z79.01 LONG TERM (CURRENT) USE OF ANTICOAGULANTS: ICD-10-CM

## 2020-04-14 DIAGNOSIS — I27.0 PULMONARY HYPERTENSION, PRIMARY (HCC): ICD-10-CM

## 2020-04-14 LAB — INR PPP: 1.7 (ref 0.9–1.1)

## 2020-04-14 PROCEDURE — 36416 COLLJ CAPILLARY BLOOD SPEC: CPT | Performed by: INTERNAL MEDICINE

## 2020-04-14 PROCEDURE — 85610 PROTHROMBIN TIME: CPT | Performed by: INTERNAL MEDICINE

## 2020-04-14 RX ORDER — METOPROLOL SUCCINATE 25 MG/1
TABLET, EXTENDED RELEASE ORAL
Qty: 90 TABLET | Refills: 1 | Status: SHIPPED | OUTPATIENT
Start: 2020-04-14 | End: 2020-10-28 | Stop reason: SDUPTHER

## 2020-04-14 RX ORDER — WARFARIN SODIUM 3 MG/1
3 TABLET ORAL DAILY
Qty: 90 TABLET | Refills: 0 | Status: SHIPPED | OUTPATIENT
Start: 2020-04-14 | End: 2020-07-06 | Stop reason: SDUPTHER

## 2020-04-14 NOTE — TELEPHONE ENCOUNTER
Pt wants refills on Metoprolol and Warfarin sent to [address] [facility]. 90 day supply. Will send in RX cjRN

## 2020-04-14 NOTE — OUTREACH NOTE
Medical Week 2 Survey      Responses   [facility] patient discharged from?  [facility]   COVID-19 Test Status  Negative   Does the patient have one of the following disease processes/diagnoses(primary or secondary)?  Other   Week 2 attempt successful?  Yes   Call start time  1705   Discharge diagnosis  Afib, pulmonary hypertension, covid negative   Call end time  1712   Meds reviewed with patient/caregiver?  Yes   Is the patient having any side effects they believe may be caused by any medication additions or changes?  No   Does the patient have all medications ordered at discharge?  N/A   Is the patient taking all medications as directed (includes completed medication regime)?  N/A   Does the patient have a primary care provider?   Yes   Does the patient have an appointment with their PCP within 7 days of discharge?  Yes   Has the patient kept scheduled appointments due by today?  Yes   Has home health visited the patient within 72 hours of discharge?  N/A   Psychosocial issues?  No   Did the patient receive a copy of their discharge instructions?  Yes   Nursing interventions  Reviewed instructions with patient   What is the patient's perception of their health status since discharge?  Improving   Is the patient/caregiver able to teach back signs and symptoms related to disease process for when to call PCP?  Yes   Is the patient/caregiver able to teach back signs and symptoms related to disease process for when to call 911?  Yes   Is the patient/caregiver able to teach back the hierarchy of who to call/visit for symptoms/problems? PCP, Specialist, Home health nurse, Urgent Care, ED, 911  Yes   Additional teach back comments  pt states understanding of watching for SOB, fever, cough, etc   Week 2 Call Completed?  Yes          Frances Shah RN

## 2020-04-20 ENCOUNTER — READMISSION MANAGEMENT (OUTPATIENT)
Dept: CALL CENTER | Facility: HOSPITAL | Age: 70
End: 2020-04-20

## 2020-04-20 NOTE — OUTREACH NOTE
Medical Week 3 Survey      Responses   [facility] patient discharged from?  [facility]   COVID-19 Test Status  Negative   Does the patient have one of the following disease processes/diagnoses(primary or secondary)?  Other   Week 3 attempt successful?  Yes   Call start time  1316   Call end time  1318   Discharge diagnosis  Afib, pulmonary hypertension, covid negative   Is the patient taking all medications as directed (includes completed medication regime)?  Yes   Does the patient have a primary care provider?   Yes   Does the patient have an appointment with their PCP within 7 days of discharge?  Greater than 7 days   Has the patient kept scheduled appointments due by today?  Yes   Comments  States she already had a scheduled appt and is just going to keep it.  She has already had an appt with her pulmonary doctor   Has home health visited the patient within 72 hours of discharge?  N/A   Did the patient receive a copy of their discharge instructions?  Yes   Nursing interventions  Reviewed instructions with patient   What is the patient's perception of their health status since discharge?  Improving   Is the patient/caregiver able to teach back signs and symptoms related to disease process for when to call PCP?  Yes   Is the patient/caregiver able to teach back signs and symptoms related to disease process for when to call 911?  Yes   Is the patient/caregiver able to teach back the hierarchy of who to call/visit for symptoms/problems? PCP, Specialist, Home health nurse, Urgent Care, ED, 911  Yes   Additional teach back comments  States she is doing well with no fever, sob, cough or chest pain.  She states if she should have any issues, she will contact her doctor   Week 3 Call Completed?  Yes   Graduated  Yes   Did the patient feel the follow up calls were helpful during their recovery period?  Yes   Was the number of calls appropriate?  Yes   Graduated/Revoked comments  No questions or needs at this time           Astrid Hogan, ANDREWN

## 2020-05-06 PROCEDURE — 93010 ELECTROCARDIOGRAM REPORT: CPT | Performed by: INTERNAL MEDICINE

## 2020-05-19 LAB — INR PPP: 2.6

## 2020-05-20 ENCOUNTER — ANTICOAGULATION VISIT (OUTPATIENT)
Dept: CARDIOLOGY | Facility: CLINIC | Age: 70
End: 2020-05-20

## 2020-05-20 DIAGNOSIS — Z95.3 HISTORY OF MITRAL VALVE REPLACEMENT WITH BIOPROSTHETIC VALVE: ICD-10-CM

## 2020-05-20 DIAGNOSIS — Z79.01 LONG TERM (CURRENT) USE OF ANTICOAGULANTS: ICD-10-CM

## 2020-05-20 DIAGNOSIS — I27.0 PULMONARY HYPERTENSION, PRIMARY (HCC): ICD-10-CM

## 2020-05-20 DIAGNOSIS — I48.91 ATRIAL FIBRILLATION, UNSPECIFIED TYPE (HCC): ICD-10-CM

## 2020-06-19 ENCOUNTER — TELEPHONE (OUTPATIENT)
Dept: CARDIOLOGY | Facility: CLINIC | Age: 70
End: 2020-06-19

## 2020-06-19 NOTE — TELEPHONE ENCOUNTER
Dr. Deni Rodgers   Phone [phone] Fax [phone]   Right ring finger trigger release 6/29/2020  No mention of holding any medications  LOV 9/5/2019. Will let Dr Day review. She may need apt

## 2020-06-24 ENCOUNTER — TRANSCRIBE ORDERS (OUTPATIENT)
Dept: ADMINISTRATIVE | Facility: HOSPITAL | Age: 70
End: 2020-06-24

## 2020-06-24 ENCOUNTER — HOSPITAL ENCOUNTER (OUTPATIENT)
Dept: CARDIOLOGY | Facility: HOSPITAL | Age: 70
Discharge: HOME | End: 2020-06-24
Admitting: ORTHOPAEDIC SURGERY

## 2020-06-24 ENCOUNTER — LAB (OUTPATIENT)
Dept: LAB | Facility: HOSPITAL | Age: 70
End: 2020-06-24

## 2020-06-24 DIAGNOSIS — M65.341 TRIGGER RING FINGER OF RIGHT HAND: ICD-10-CM

## 2020-06-24 DIAGNOSIS — Z01.818 PRE-OP TESTING: ICD-10-CM

## 2020-06-24 DIAGNOSIS — Z01.811 PRE-OP CHEST EXAM: Primary | ICD-10-CM

## 2020-06-24 LAB
ANION GAP SERPL CALCULATED.3IONS-SCNC: 9.3 MMOL/L (ref 5–15)
BASOPHILS # BLD AUTO: 0.06 10*3/MM3 (ref 0–0.2)
BASOPHILS NFR BLD AUTO: 0.8 % (ref 0–1.5)
BUN BLD-MCNC: 21 MG/DL (ref 8–23)
BUN/CREAT SERPL: 23.3 (ref 7–25)
CALCIUM SPEC-SCNC: 9.4 MG/DL (ref 8.6–10.5)
CHLORIDE SERPL-SCNC: 103 MMOL/L (ref 98–107)
CO2 SERPL-SCNC: 26.7 MMOL/L (ref 22–29)
CREAT BLD-MCNC: 0.9 MG/DL (ref 0.57–1)
DEPRECATED RDW RBC AUTO: 45 FL (ref 37–54)
EOSINOPHIL # BLD AUTO: 0.39 10*3/MM3 (ref 0–0.4)
EOSINOPHIL NFR BLD AUTO: 5.4 % (ref 0.3–6.2)
ERYTHROCYTE [DISTWIDTH] IN BLOOD BY AUTOMATED COUNT: 14.4 % (ref 12.3–15.4)
GFR SERPL CREATININE-BSD FRML MDRD: 62 ML/MIN/1.73
GLUCOSE BLD-MCNC: 102 MG/DL (ref 65–99)
HCT VFR BLD AUTO: 36.7 % (ref 34–46.6)
HGB BLD-MCNC: 11.8 G/DL (ref 12–15.9)
IMM GRANULOCYTES # BLD AUTO: 0.02 10*3/MM3 (ref 0–0.05)
IMM GRANULOCYTES NFR BLD AUTO: 0.3 % (ref 0–0.5)
LYMPHOCYTES # BLD AUTO: 2.27 10*3/MM3 (ref 0.7–3.1)
LYMPHOCYTES NFR BLD AUTO: 31.6 % (ref 19.6–45.3)
MCH RBC QN AUTO: 27.3 PG (ref 26.6–33)
MCHC RBC AUTO-ENTMCNC: 32.2 G/DL (ref 31.5–35.7)
MCV RBC AUTO: 85 FL (ref 79–97)
MONOCYTES # BLD AUTO: 0.68 10*3/MM3 (ref 0.1–0.9)
MONOCYTES NFR BLD AUTO: 9.5 % (ref 5–12)
NEUTROPHILS # BLD AUTO: 3.77 10*3/MM3 (ref 1.7–7)
NEUTROPHILS NFR BLD AUTO: 52.4 % (ref 42.7–76)
NRBC BLD AUTO-RTO: 0 /100 WBC (ref 0–0.2)
PLATELET # BLD AUTO: 238 10*3/MM3 (ref 140–450)
PMV BLD AUTO: 11.8 FL (ref 6–12)
POTASSIUM BLD-SCNC: 4.8 MMOL/L (ref 3.5–5.2)
RBC # BLD AUTO: 4.32 10*6/MM3 (ref 3.77–5.28)
SODIUM BLD-SCNC: 139 MMOL/L (ref 136–145)
WBC NRBC COR # BLD: 7.19 10*3/MM3 (ref 3.4–10.8)

## 2020-06-24 PROCEDURE — 85025 COMPLETE CBC W/AUTO DIFF WBC: CPT

## 2020-06-24 PROCEDURE — 36415 COLL VENOUS BLD VENIPUNCTURE: CPT

## 2020-06-24 PROCEDURE — 80048 BASIC METABOLIC PNL TOTAL CA: CPT

## 2020-06-24 PROCEDURE — 93005 ELECTROCARDIOGRAM TRACING: CPT | Performed by: ORTHOPAEDIC SURGERY

## 2020-06-26 NOTE — TELEPHONE ENCOUNTER
Per Dr. Day patient has to be admitted for surgery due to Valve replacement. He is calling Dr. Rodgers this afternoon about admitting patient to decide if she needs to reschedule her surgery Monday or not.

## 2020-06-26 NOTE — TELEPHONE ENCOUNTER
Called pt for INR due 6/22 - says she forgot about appointment but she is supposed to be having surgery 6/29  Needing answer on clearance and says Dr. Rodgers would make call on bridge and stopping Coumadin if she doesn't hear about clearance today.     Advised would try to see about status on clearance. Will have appointment with Dr. Day 7/6 - needs to be checked for INR same day.

## 2020-06-28 PROCEDURE — 93010 ELECTROCARDIOGRAM REPORT: CPT | Performed by: INTERNAL MEDICINE

## 2020-07-06 ENCOUNTER — OFFICE VISIT (OUTPATIENT)
Dept: CARDIOLOGY | Facility: CLINIC | Age: 70
End: 2020-07-06

## 2020-07-06 ENCOUNTER — ANTICOAGULATION VISIT (OUTPATIENT)
Dept: CARDIOLOGY | Facility: CLINIC | Age: 70
End: 2020-07-06

## 2020-07-06 VITALS
DIASTOLIC BLOOD PRESSURE: 70 MMHG | HEART RATE: 80 BPM | SYSTOLIC BLOOD PRESSURE: 121 MMHG | WEIGHT: 163 LBS | OXYGEN SATURATION: 96 % | BODY MASS INDEX: 28.88 KG/M2 | HEIGHT: 63 IN

## 2020-07-06 DIAGNOSIS — I05.9 MITRAL VALVE DISEASE: ICD-10-CM

## 2020-07-06 DIAGNOSIS — I27.0 PULMONARY HYPERTENSION, PRIMARY (HCC): ICD-10-CM

## 2020-07-06 DIAGNOSIS — I34.0 NONRHEUMATIC MITRAL VALVE REGURGITATION: ICD-10-CM

## 2020-07-06 DIAGNOSIS — Z98.890 S/P TRICUSPID VALVE REPAIR: ICD-10-CM

## 2020-07-06 DIAGNOSIS — Z95.3 HISTORY OF MITRAL VALVE REPLACEMENT WITH BIOPROSTHETIC VALVE: ICD-10-CM

## 2020-07-06 DIAGNOSIS — E78.00 PURE HYPERCHOLESTEROLEMIA: ICD-10-CM

## 2020-07-06 DIAGNOSIS — Z79.01 LONG TERM (CURRENT) USE OF ANTICOAGULANTS: ICD-10-CM

## 2020-07-06 DIAGNOSIS — I48.0 PAROXYSMAL ATRIAL FIBRILLATION (HCC): ICD-10-CM

## 2020-07-06 DIAGNOSIS — G47.33 OBSTRUCTIVE SLEEP APNEA SYNDROME: ICD-10-CM

## 2020-07-06 DIAGNOSIS — I48.0 PAROXYSMAL ATRIAL FIBRILLATION (HCC): Primary | ICD-10-CM

## 2020-07-06 LAB — INR PPP: 1.8 (ref 0.9–1.1)

## 2020-07-06 PROCEDURE — 36416 COLLJ CAPILLARY BLOOD SPEC: CPT | Performed by: INTERNAL MEDICINE

## 2020-07-06 PROCEDURE — 99214 OFFICE O/P EST MOD 30 MIN: CPT | Performed by: INTERNAL MEDICINE

## 2020-07-06 PROCEDURE — 85610 PROTHROMBIN TIME: CPT | Performed by: INTERNAL MEDICINE

## 2020-07-06 RX ORDER — WARFARIN SODIUM 3 MG/1
3 TABLET ORAL DAILY
Qty: 90 TABLET | Refills: 0 | Status: SHIPPED | OUTPATIENT
Start: 2020-07-06 | End: 2020-10-02

## 2020-07-06 RX ORDER — ROSUVASTATIN CALCIUM 5 MG/1
2.5 TABLET, COATED ORAL DAILY
Qty: 90 TABLET | Refills: 3 | Status: SHIPPED | OUTPATIENT
Start: 2020-07-06

## 2020-07-06 NOTE — PROGRESS NOTES
"    Subjective:     Encounter Date:07/06/2020      Patient ID: Aleena Benson is a 70 y.o. female.    Chief Complaint:  History of Present Illness 70-year-old white female with history of mitral valve and cuspid valve disorder status post mitral valve replacement and tricuspid valve repair history approximately fibrillation hypertension hyperlipidemia sleep apnea presents to my office for follow-up.  Patient is currently stable with absence of chest pain but occasional shortness of breath with exertion.  No complains of any PND orthopnea.  No palpitation dizziness syncope or swelling of the feet.  Patient has been taking all the medicines regularly.  Patient does not smoke.  She is trying to exercise regular.  She follows a good diet    The following portions of the patient's history were reviewed and updated as appropriate: allergies, current medications, past family history, past medical history, past social history, past surgical history and problem list.  Past Medical History:   Diagnosis Date   • Atrial fibrillation (CMS/HCC)    • Hyperlipidemia    • Hypertension    • Sleep apnea    • Valvular disease      Past Surgical History:   Procedure Laterality Date   • ABDOMINAL SURGERY  2016    gastric bypass   • ADENOIDECTOMY     • BLADDER SURGERY      bladder sling   • CARDIAC CATHETERIZATION  2013 2015, 2017   • CHOLECYSTECTOMY     • HYSTERECTOMY     • MITRAL VALVE REPLACEMENT  2013   • THYROID SURGERY     • TONSILLECTOMY     • TRICUSPID VALVE REPLACEMENT  2013     /70 (BP Location: Left arm, Patient Position: Sitting)   Pulse 80   Ht 160 cm (63\")   Wt 73.9 kg (163 lb)   SpO2 96%   BMI 28.87 kg/m²   Family History   Problem Relation Age of Onset   • Heart disease Father    • Heart disease Brother    • Heart disease Paternal Uncle        Current Outpatient Medications:   •  ALPRAZolam (XANAX) 0.5 MG tablet, Take 0.5 mg by mouth 2 (Two) Times a Day As Needed for Anxiety., Disp: , Rfl: 0  •  aspirin 81 MG " tablet, Take 81 mg by mouth Daily., Disp: , Rfl:   •  bumetanide (BUMEX) 1 MG tablet, Take 1 tablet by mouth Daily., Disp: 90 tablet, Rfl: 0  •  Calcium Carb-Cholecalciferol 500-400 MG-UNIT chewable tablet, Chew 1 tablet Daily., Disp: , Rfl:   •  cetirizine (ZYRTEC ALLERGY) 10 MG tablet, Take 10 mg by mouth Daily., Disp: , Rfl:   •  Cholecalciferol (VITAMIN D-3) 1000 units capsule, Take 1,000 Units by mouth Daily., Disp: , Rfl:   •  Denosumab (PROLIA SC), Inject 60 mg as directed Every 6 (Six) Months., Disp: , Rfl:   •  escitalopram (LEXAPRO) 20 MG tablet, Take 20 mg by mouth Daily., Disp: , Rfl:   •  levothyroxine (SYNTHROID) 137 MCG tablet, Take 137 mcg by mouth Daily., Disp: , Rfl:   •  Macitentan (OPSUMIT) 10 MG tablet, Take 10 mg by mouth Daily., Disp: , Rfl:   •  metoprolol succinate XL (TOPROL-XL) 25 MG 24 hr tablet, Take 1 tablet by mouth daily, Disp: 90 tablet, Rfl: 1  •  Multiple Vitamins-Minerals (CENTRUM SILVER) tablet, Take 1 tablet by mouth Daily., Disp: , Rfl:   •  omeprazole (priLOSEC) 40 MG capsule, Take 40 mg by mouth Daily., Disp: , Rfl:   •  sildenafil (REVATIO) 20 MG tablet, Take 20 mg by mouth 3 (Three) Times a Day., Disp: , Rfl:   •  warfarin (COUMADIN) 3 MG tablet, Take 1 tablet by mouth Daily., Disp: 90 tablet, Rfl: 0  •  rosuvastatin (CRESTOR) 5 MG tablet, Take 0.5 tablets by mouth Daily., Disp: 90 tablet, Rfl: 3  Allergies   Allergen Reactions   • Adhesive Tape Unknown (See Comments)   • Codeine Unknown (See Comments)     Social History     Socioeconomic History   • Marital status:      Spouse name: Not on file   • Number of children: Not on file   • Years of education: Not on file   • Highest education level: Not on file   Tobacco Use   • Smoking status: Never Smoker   • Smokeless tobacco: Never Used   Substance and Sexual Activity   • Alcohol use: Not Currently   • Drug use: Never   • Sexual activity: Defer     Review of Systems   Constitution: Positive for malaise/fatigue.  Negative for fever.   HENT: Negative for ear pain and nosebleeds.    Eyes: Negative for blurred vision and double vision.   Cardiovascular: Negative for chest pain, dyspnea on exertion and palpitations.   Respiratory: Positive for shortness of breath. Negative for cough.    Skin: Negative for rash.   Musculoskeletal: Negative for joint pain.   Gastrointestinal: Negative for abdominal pain, nausea and vomiting.   Neurological: Negative for focal weakness, headaches and light-headedness.   Psychiatric/Behavioral: Negative for depression. The patient is not nervous/anxious.    All other systems reviewed and are negative.             Objective:     Physical Exam   Constitutional: She appears well-developed and well-nourished.   HENT:   Head: Normocephalic and atraumatic.   Eyes: Pupils are equal, round, and reactive to light. Conjunctivae and EOM are normal. No scleral icterus.   Neck: Normal range of motion. Neck supple. No JVD present. Carotid bruit is not present.   Cardiovascular: Normal rate, regular rhythm, S1 normal, S2 normal and intact distal pulses. PMI is not displaced.   Murmur heard.  Pulmonary/Chest: Effort normal and breath sounds normal. She has no wheezes. She has no rales.   Abdominal: Soft. Bowel sounds are normal.   Musculoskeletal: Normal range of motion.   Neurological: She is alert. She has normal strength.   No focal deficits   Skin: Skin is warm and dry. No rash noted.   Psychiatric: She has a normal mood and affect.     Procedures    Lab Review:       Assessment:          Diagnosis Plan   1. Paroxysmal atrial fibrillation (CMS/HCC)     2. Pure hypercholesterolemia     3. Pulmonary hypertension, primary (CMS/HCC)     4. Mitral valve disease     5. S/P tricuspid valve repair     6. History of mitral valve replacement with bioprosthetic valve [Z95.3]     7. Obstructive sleep apnea syndrome     8. Nonrheumatic mitral valve regurgitation            Plan:     Patient has history of mitral valve  disorder status post mitral valve replacement with a bioprosthetic valve  Patient has tricuspid valve disease and status post tricuspid valve repair  Patient has normal be systolic function  Patient has history of pulmonary hypertension and is followed by pulmonologist and is on medical therapy  Patient has history approximately fibrillation and she is currently in sinus rhythm but she is also on warfarin  Patient's lipid levels and sugar levels are followed by the primary care doctor  Patient's blood pressure and heart rate stable  Patient has sleep apnea and uses CPAP machine

## 2020-07-22 ENCOUNTER — TELEPHONE (OUTPATIENT)
Dept: CARDIOLOGY | Facility: CLINIC | Age: 70
End: 2020-07-22

## 2020-08-03 ENCOUNTER — ANTICOAGULATION VISIT (OUTPATIENT)
Dept: CARDIOLOGY | Facility: CLINIC | Age: 70
End: 2020-08-03

## 2020-08-03 VITALS
WEIGHT: 162 LBS | HEART RATE: 64 BPM | SYSTOLIC BLOOD PRESSURE: 123 MMHG | BODY MASS INDEX: 28.7 KG/M2 | DIASTOLIC BLOOD PRESSURE: 67 MMHG

## 2020-08-03 DIAGNOSIS — I27.0 PULMONARY HYPERTENSION, PRIMARY (HCC): ICD-10-CM

## 2020-08-03 DIAGNOSIS — I48.0 PAROXYSMAL ATRIAL FIBRILLATION (HCC): ICD-10-CM

## 2020-08-03 DIAGNOSIS — Z95.3 HISTORY OF MITRAL VALVE REPLACEMENT WITH BIOPROSTHETIC VALVE: ICD-10-CM

## 2020-08-03 DIAGNOSIS — Z79.01 LONG TERM (CURRENT) USE OF ANTICOAGULANTS: ICD-10-CM

## 2020-08-03 LAB — INR PPP: 2.1 (ref 0.9–1.1)

## 2020-08-03 PROCEDURE — 36416 COLLJ CAPILLARY BLOOD SPEC: CPT | Performed by: INTERNAL MEDICINE

## 2020-08-03 PROCEDURE — 85610 PROTHROMBIN TIME: CPT | Performed by: INTERNAL MEDICINE

## 2020-08-20 ENCOUNTER — LAB (OUTPATIENT)
Dept: LAB | Facility: HOSPITAL | Age: 70
End: 2020-08-20

## 2020-08-20 ENCOUNTER — TRANSCRIBE ORDERS (OUTPATIENT)
Dept: LAB | Facility: HOSPITAL | Age: 70
End: 2020-08-20

## 2020-08-20 DIAGNOSIS — R06.00 DYSPNEA, UNSPECIFIED TYPE: Primary | ICD-10-CM

## 2020-08-20 DIAGNOSIS — R06.00 DYSPNEA, UNSPECIFIED TYPE: ICD-10-CM

## 2020-08-20 LAB — NT-PROBNP SERPL-MCNC: 507.9 PG/ML (ref 0–900)

## 2020-08-20 PROCEDURE — 36415 COLL VENOUS BLD VENIPUNCTURE: CPT

## 2020-08-20 PROCEDURE — 83880 ASSAY OF NATRIURETIC PEPTIDE: CPT

## 2020-08-25 ENCOUNTER — TELEPHONE (OUTPATIENT)
Dept: CARDIOLOGY | Facility: CLINIC | Age: 70
End: 2020-08-25

## 2020-08-25 NOTE — TELEPHONE ENCOUNTER
Pt called to ask about the status of her clearance for colonoscopy. They are asking her to hold Warfarin for 5 days. She is thinking she will need bridging. I did not see the form in Media. Please advise pt. [phone]. Thanks Jose Luis

## 2020-08-27 NOTE — TELEPHONE ENCOUNTER
Spoke to Dr. Day's MA about status of Clearance for colonoscopy. They did not have one because they thought the patient had changed her mind about having procedure done. They were going to call pt and clarify if she was proceeding with procedure. Please call patient, procedure is scheduled in the next couple of weeks. Thanks. Jose Luis

## 2020-08-28 NOTE — TELEPHONE ENCOUNTER
Discussed with patient.  Patient had a mitral valve replacement with a biological prosthesis and a tricuspid valve repair.  Patient is on warfarin for A. fib.  She can hold it for 3 to 4 days and have the procedure done without any bridging and then restart Coumadin right after her colonoscopy.  She is cleared for the procedure.

## 2020-09-09 ENCOUNTER — ANTICOAGULATION VISIT (OUTPATIENT)
Dept: CARDIOLOGY | Facility: CLINIC | Age: 70
End: 2020-09-09

## 2020-09-09 VITALS
HEART RATE: 67 BPM | BODY MASS INDEX: 28.34 KG/M2 | DIASTOLIC BLOOD PRESSURE: 64 MMHG | WEIGHT: 160 LBS | SYSTOLIC BLOOD PRESSURE: 117 MMHG

## 2020-09-09 DIAGNOSIS — I27.0 PULMONARY HYPERTENSION, PRIMARY (HCC): ICD-10-CM

## 2020-09-09 DIAGNOSIS — Z79.01 LONG TERM (CURRENT) USE OF ANTICOAGULANTS: ICD-10-CM

## 2020-09-09 DIAGNOSIS — I48.0 PAROXYSMAL ATRIAL FIBRILLATION (HCC): ICD-10-CM

## 2020-09-09 DIAGNOSIS — Z95.3 HISTORY OF MITRAL VALVE REPLACEMENT WITH BIOPROSTHETIC VALVE: ICD-10-CM

## 2020-09-09 LAB — INR PPP: 1.2 (ref 0.9–1.1)

## 2020-09-09 PROCEDURE — 85610 PROTHROMBIN TIME: CPT | Performed by: INTERNAL MEDICINE

## 2020-09-09 PROCEDURE — 36416 COLLJ CAPILLARY BLOOD SPEC: CPT | Performed by: INTERNAL MEDICINE

## 2020-09-10 ENCOUNTER — OFFICE (AMBULATORY)
Dept: URBAN - METROPOLITAN AREA PATHOLOGY 4 | Facility: PATHOLOGY | Age: 70
End: 2020-09-10
Payer: COMMERCIAL

## 2020-09-10 ENCOUNTER — OFFICE (AMBULATORY)
Dept: URBAN - METROPOLITAN AREA PATHOLOGY 4 | Facility: PATHOLOGY | Age: 70
End: 2020-09-10

## 2020-09-10 ENCOUNTER — ON CAMPUS - OUTPATIENT (AMBULATORY)
Dept: URBAN - METROPOLITAN AREA HOSPITAL 2 | Facility: HOSPITAL | Age: 70
End: 2020-09-10

## 2020-09-10 VITALS
HEART RATE: 65 BPM | SYSTOLIC BLOOD PRESSURE: 119 MMHG | SYSTOLIC BLOOD PRESSURE: 110 MMHG | HEART RATE: 68 BPM | DIASTOLIC BLOOD PRESSURE: 56 MMHG | DIASTOLIC BLOOD PRESSURE: 42 MMHG | RESPIRATION RATE: 17 BRPM | SYSTOLIC BLOOD PRESSURE: 114 MMHG | OXYGEN SATURATION: 98 % | DIASTOLIC BLOOD PRESSURE: 95 MMHG | RESPIRATION RATE: 19 BRPM | SYSTOLIC BLOOD PRESSURE: 111 MMHG | DIASTOLIC BLOOD PRESSURE: 49 MMHG | DIASTOLIC BLOOD PRESSURE: 61 MMHG | HEART RATE: 73 BPM | DIASTOLIC BLOOD PRESSURE: 63 MMHG | WEIGHT: 161.4 LBS | HEART RATE: 69 BPM | HEART RATE: 64 BPM | HEIGHT: 63 IN | HEART RATE: 74 BPM | SYSTOLIC BLOOD PRESSURE: 122 MMHG | OXYGEN SATURATION: 95 % | HEART RATE: 66 BPM | SYSTOLIC BLOOD PRESSURE: 121 MMHG | DIASTOLIC BLOOD PRESSURE: 76 MMHG | SYSTOLIC BLOOD PRESSURE: 93 MMHG | DIASTOLIC BLOOD PRESSURE: 47 MMHG | TEMPERATURE: 97.8 F | RESPIRATION RATE: 18 BRPM | HEART RATE: 67 BPM | OXYGEN SATURATION: 99 % | OXYGEN SATURATION: 100 % | SYSTOLIC BLOOD PRESSURE: 98 MMHG | OXYGEN SATURATION: 97 % | SYSTOLIC BLOOD PRESSURE: 127 MMHG

## 2020-09-10 DIAGNOSIS — D12.2 BENIGN NEOPLASM OF ASCENDING COLON: ICD-10-CM

## 2020-09-10 DIAGNOSIS — D12.3 BENIGN NEOPLASM OF TRANSVERSE COLON: ICD-10-CM

## 2020-09-10 DIAGNOSIS — K64.1 SECOND DEGREE HEMORRHOIDS: ICD-10-CM

## 2020-09-10 DIAGNOSIS — K63.5 POLYP OF COLON: ICD-10-CM

## 2020-09-10 DIAGNOSIS — Z86.010 PERSONAL HISTORY OF COLONIC POLYPS: ICD-10-CM

## 2020-09-10 LAB
GI HISTOLOGY: A. UNSPECIFIED: (no result)
GI HISTOLOGY: B. UNSPECIFIED: (no result)
GI HISTOLOGY: C. UNSPECIFIED: (no result)
GI HISTOLOGY: PDF REPORT: (no result)

## 2020-09-10 PROCEDURE — 88305 TISSUE EXAM BY PATHOLOGIST: CPT | Mod: 26 | Performed by: INTERNAL MEDICINE

## 2020-09-10 PROCEDURE — 45385 COLONOSCOPY W/LESION REMOVAL: CPT | Mod: PT | Performed by: INTERNAL MEDICINE

## 2020-09-23 ENCOUNTER — ANTICOAGULATION VISIT (OUTPATIENT)
Dept: CARDIOLOGY | Facility: CLINIC | Age: 70
End: 2020-09-23

## 2020-09-23 VITALS
WEIGHT: 165 LBS | DIASTOLIC BLOOD PRESSURE: 65 MMHG | HEART RATE: 66 BPM | BODY MASS INDEX: 29.23 KG/M2 | SYSTOLIC BLOOD PRESSURE: 104 MMHG

## 2020-09-23 DIAGNOSIS — Z79.01 LONG TERM (CURRENT) USE OF ANTICOAGULANTS: ICD-10-CM

## 2020-09-23 DIAGNOSIS — I48.0 PAROXYSMAL ATRIAL FIBRILLATION (HCC): ICD-10-CM

## 2020-09-23 DIAGNOSIS — I27.0 PULMONARY HYPERTENSION, PRIMARY (HCC): ICD-10-CM

## 2020-09-23 DIAGNOSIS — Z95.3 HISTORY OF MITRAL VALVE REPLACEMENT WITH BIOPROSTHETIC VALVE: ICD-10-CM

## 2020-09-23 LAB — INR PPP: 2.8 (ref 0.9–1.1)

## 2020-09-23 PROCEDURE — 85610 PROTHROMBIN TIME: CPT | Performed by: INTERNAL MEDICINE

## 2020-09-23 PROCEDURE — 36416 COLLJ CAPILLARY BLOOD SPEC: CPT | Performed by: INTERNAL MEDICINE

## 2020-10-02 DIAGNOSIS — Z95.3 HISTORY OF MITRAL VALVE REPLACEMENT WITH BIOPROSTHETIC VALVE: ICD-10-CM

## 2020-10-02 DIAGNOSIS — Z79.01 LONG TERM (CURRENT) USE OF ANTICOAGULANTS: ICD-10-CM

## 2020-10-02 DIAGNOSIS — I48.0 PAROXYSMAL ATRIAL FIBRILLATION (HCC): ICD-10-CM

## 2020-10-02 RX ORDER — WARFARIN SODIUM 3 MG/1
TABLET ORAL
Qty: 90 TABLET | Refills: 0 | Status: SHIPPED | OUTPATIENT
Start: 2020-10-02 | End: 2020-12-30 | Stop reason: SDUPTHER

## 2020-10-16 ENCOUNTER — TELEPHONE (OUTPATIENT)
Dept: CARDIOLOGY | Facility: CLINIC | Age: 70
End: 2020-10-16

## 2020-10-16 NOTE — TELEPHONE ENCOUNTER
Patient called stating she needs a trigger release surgery. She wants to know how long she can hold her warfarin. Surgery is not scheduled and she has not discussed this with Dr. Rodgers. I advised the patient she needs to contact the surgeon's office about scheduling the procedure and they need to fax us a signed request. Pt given our fax number and she verbalizes understanding apLPN

## 2020-10-20 ENCOUNTER — TELEPHONE (OUTPATIENT)
Dept: CARDIOLOGY | Facility: CLINIC | Age: 70
End: 2020-10-20

## 2020-10-20 NOTE — TELEPHONE ENCOUNTER
DR. BENJAMIN CARBAJAL  PHONE [phone]  [phone]  RIGHT TRIGGER FINGER SURGERY   NO MENTION ON HOLDING MEDICATIONS  LOV 7/6/2020

## 2020-10-27 NOTE — TELEPHONE ENCOUNTER
Pt called and left a voicemail. She is having trigger finger release with Dr. Rodgers and is requesting OK to hold Warfarin. Please call [phone]

## 2020-10-28 ENCOUNTER — ANTICOAGULATION VISIT (OUTPATIENT)
Dept: CARDIOLOGY | Facility: CLINIC | Age: 70
End: 2020-10-28

## 2020-10-28 VITALS
SYSTOLIC BLOOD PRESSURE: 116 MMHG | HEART RATE: 73 BPM | DIASTOLIC BLOOD PRESSURE: 72 MMHG | BODY MASS INDEX: 28.87 KG/M2 | WEIGHT: 163 LBS

## 2020-10-28 DIAGNOSIS — I27.0 PULMONARY HYPERTENSION, PRIMARY (HCC): ICD-10-CM

## 2020-10-28 DIAGNOSIS — I48.0 PAROXYSMAL ATRIAL FIBRILLATION (HCC): Primary | ICD-10-CM

## 2020-10-28 DIAGNOSIS — Z95.3 HISTORY OF MITRAL VALVE REPLACEMENT WITH BIOPROSTHETIC VALVE: ICD-10-CM

## 2020-10-28 DIAGNOSIS — Z79.01 LONG TERM (CURRENT) USE OF ANTICOAGULANTS: ICD-10-CM

## 2020-10-28 DIAGNOSIS — I48.0 PAROXYSMAL ATRIAL FIBRILLATION (HCC): ICD-10-CM

## 2020-10-28 LAB — INR PPP: 2.2 (ref 0.9–1.1)

## 2020-10-28 PROCEDURE — 85610 PROTHROMBIN TIME: CPT | Performed by: INTERNAL MEDICINE

## 2020-10-28 PROCEDURE — 36416 COLLJ CAPILLARY BLOOD SPEC: CPT | Performed by: INTERNAL MEDICINE

## 2020-10-28 RX ORDER — METOPROLOL SUCCINATE 25 MG/1
TABLET, EXTENDED RELEASE ORAL
Qty: 90 TABLET | Refills: 1 | Status: SHIPPED | OUTPATIENT
Start: 2020-10-28 | End: 2021-02-09 | Stop reason: SDUPTHER

## 2020-10-28 NOTE — TELEPHONE ENCOUNTER
Per Dr. Barb Larsen spoke to patient about having to have surgery at [facility] only, surgery can not be at [facility] due to her needing to be admitted for surgery.

## 2020-11-12 ENCOUNTER — HOSPITAL ENCOUNTER (OUTPATIENT)
Dept: CARDIOLOGY | Facility: HOSPITAL | Age: 70
Discharge: HOME | End: 2020-11-12

## 2020-11-12 ENCOUNTER — LAB (OUTPATIENT)
Dept: LAB | Facility: HOSPITAL | Age: 70
End: 2020-11-12

## 2020-11-12 ENCOUNTER — TRANSCRIBE ORDERS (OUTPATIENT)
Dept: ADMINISTRATIVE | Facility: HOSPITAL | Age: 70
End: 2020-11-12

## 2020-11-12 DIAGNOSIS — Z01.818 PRE-OP TESTING: ICD-10-CM

## 2020-11-12 DIAGNOSIS — Z01.818 PRE-OP TESTING: Primary | ICD-10-CM

## 2020-11-12 LAB
ANION GAP SERPL CALCULATED.3IONS-SCNC: 5.2 MMOL/L (ref 5–15)
BASOPHILS # BLD AUTO: 0.04 10*3/MM3 (ref 0–0.2)
BASOPHILS NFR BLD AUTO: 0.7 % (ref 0–1.5)
BUN SERPL-MCNC: 17 MG/DL (ref 8–23)
BUN/CREAT SERPL: 18.1 (ref 7–25)
CALCIUM SPEC-SCNC: 8.7 MG/DL (ref 8.6–10.5)
CHLORIDE SERPL-SCNC: 105 MMOL/L (ref 98–107)
CO2 SERPL-SCNC: 28.8 MMOL/L (ref 22–29)
CREAT SERPL-MCNC: 0.94 MG/DL (ref 0.57–1)
DEPRECATED RDW RBC AUTO: 43.3 FL (ref 37–54)
EOSINOPHIL # BLD AUTO: 0.35 10*3/MM3 (ref 0–0.4)
EOSINOPHIL NFR BLD AUTO: 6.1 % (ref 0.3–6.2)
ERYTHROCYTE [DISTWIDTH] IN BLOOD BY AUTOMATED COUNT: 13.1 % (ref 12.3–15.4)
GFR SERPL CREATININE-BSD FRML MDRD: 59 ML/MIN/1.73
GLUCOSE SERPL-MCNC: 102 MG/DL (ref 65–99)
HCT VFR BLD AUTO: 36.1 % (ref 34–46.6)
HGB BLD-MCNC: 11.5 G/DL (ref 12–15.9)
IMM GRANULOCYTES # BLD AUTO: 0.01 10*3/MM3 (ref 0–0.05)
IMM GRANULOCYTES NFR BLD AUTO: 0.2 % (ref 0–0.5)
LYMPHOCYTES # BLD AUTO: 1.69 10*3/MM3 (ref 0.7–3.1)
LYMPHOCYTES NFR BLD AUTO: 29.6 % (ref 19.6–45.3)
MCH RBC QN AUTO: 28.5 PG (ref 26.6–33)
MCHC RBC AUTO-ENTMCNC: 31.9 G/DL (ref 31.5–35.7)
MCV RBC AUTO: 89.4 FL (ref 79–97)
MONOCYTES # BLD AUTO: 0.8 10*3/MM3 (ref 0.1–0.9)
MONOCYTES NFR BLD AUTO: 14 % (ref 5–12)
NEUTROPHILS NFR BLD AUTO: 2.81 10*3/MM3 (ref 1.7–7)
NEUTROPHILS NFR BLD AUTO: 49.4 % (ref 42.7–76)
NRBC BLD AUTO-RTO: 0 /100 WBC (ref 0–0.2)
PLATELET # BLD AUTO: 222 10*3/MM3 (ref 140–450)
PMV BLD AUTO: 12 FL (ref 6–12)
POTASSIUM SERPL-SCNC: 4 MMOL/L (ref 3.5–5.2)
RBC # BLD AUTO: 4.04 10*6/MM3 (ref 3.77–5.28)
SODIUM SERPL-SCNC: 139 MMOL/L (ref 136–145)
WBC # BLD AUTO: 5.7 10*3/MM3 (ref 3.4–10.8)

## 2020-11-12 PROCEDURE — 93010 ELECTROCARDIOGRAM REPORT: CPT | Performed by: INTERNAL MEDICINE

## 2020-11-12 PROCEDURE — 80048 BASIC METABOLIC PNL TOTAL CA: CPT

## 2020-11-12 PROCEDURE — 85025 COMPLETE CBC W/AUTO DIFF WBC: CPT

## 2020-11-12 PROCEDURE — 36415 COLL VENOUS BLD VENIPUNCTURE: CPT

## 2020-11-12 PROCEDURE — 93005 ELECTROCARDIOGRAM TRACING: CPT | Performed by: ORTHOPAEDIC SURGERY

## 2020-11-23 LAB — QT INTERVAL: 385 MS

## 2020-11-25 ENCOUNTER — ANTICOAGULATION VISIT (OUTPATIENT)
Dept: CARDIOLOGY | Facility: CLINIC | Age: 70
End: 2020-11-25

## 2020-11-25 VITALS
BODY MASS INDEX: 29.23 KG/M2 | DIASTOLIC BLOOD PRESSURE: 74 MMHG | TEMPERATURE: 96.9 F | HEART RATE: 70 BPM | WEIGHT: 165 LBS | SYSTOLIC BLOOD PRESSURE: 145 MMHG

## 2020-11-25 DIAGNOSIS — I27.0 PULMONARY HYPERTENSION, PRIMARY (HCC): ICD-10-CM

## 2020-11-25 DIAGNOSIS — Z95.3 HISTORY OF MITRAL VALVE REPLACEMENT WITH BIOPROSTHETIC VALVE: ICD-10-CM

## 2020-11-25 DIAGNOSIS — I48.0 PAROXYSMAL ATRIAL FIBRILLATION (HCC): ICD-10-CM

## 2020-11-25 DIAGNOSIS — Z79.01 LONG TERM (CURRENT) USE OF ANTICOAGULANTS: ICD-10-CM

## 2020-11-25 LAB — INR PPP: 1.8 (ref 0.9–1.1)

## 2020-11-25 PROCEDURE — 85610 PROTHROMBIN TIME: CPT | Performed by: INTERNAL MEDICINE

## 2020-11-25 PROCEDURE — 36416 COLLJ CAPILLARY BLOOD SPEC: CPT | Performed by: INTERNAL MEDICINE

## 2020-12-10 ENCOUNTER — ANTICOAGULATION VISIT (OUTPATIENT)
Dept: CARDIOLOGY | Facility: CLINIC | Age: 70
End: 2020-12-10

## 2020-12-10 VITALS
TEMPERATURE: 97.3 F | BODY MASS INDEX: 28.7 KG/M2 | HEART RATE: 81 BPM | WEIGHT: 162 LBS | DIASTOLIC BLOOD PRESSURE: 69 MMHG | SYSTOLIC BLOOD PRESSURE: 133 MMHG

## 2020-12-10 DIAGNOSIS — Z79.01 LONG TERM (CURRENT) USE OF ANTICOAGULANTS: ICD-10-CM

## 2020-12-10 DIAGNOSIS — I27.0 PULMONARY HYPERTENSION, PRIMARY (HCC): ICD-10-CM

## 2020-12-10 DIAGNOSIS — Z95.3 HISTORY OF MITRAL VALVE REPLACEMENT WITH BIOPROSTHETIC VALVE: Primary | ICD-10-CM

## 2020-12-10 DIAGNOSIS — I48.0 PAROXYSMAL ATRIAL FIBRILLATION (HCC): ICD-10-CM

## 2020-12-10 LAB — INR PPP: 1.9 (ref 0.9–1.1)

## 2020-12-10 PROCEDURE — 85610 PROTHROMBIN TIME: CPT | Performed by: INTERNAL MEDICINE

## 2020-12-10 PROCEDURE — 36416 COLLJ CAPILLARY BLOOD SPEC: CPT | Performed by: INTERNAL MEDICINE

## 2020-12-30 DIAGNOSIS — I48.0 PAROXYSMAL ATRIAL FIBRILLATION (HCC): ICD-10-CM

## 2020-12-30 DIAGNOSIS — Z95.3 HISTORY OF MITRAL VALVE REPLACEMENT WITH BIOPROSTHETIC VALVE: ICD-10-CM

## 2020-12-30 DIAGNOSIS — Z79.01 LONG TERM (CURRENT) USE OF ANTICOAGULANTS: ICD-10-CM

## 2020-12-30 RX ORDER — WARFARIN SODIUM 3 MG/1
3 TABLET ORAL DAILY
Qty: 90 TABLET | Refills: 0 | Status: SHIPPED | OUTPATIENT
Start: 2020-12-30 | End: 2021-04-07 | Stop reason: SDUPTHER

## 2020-12-30 NOTE — TELEPHONE ENCOUNTER
Patient in [place], needs refill on warfarin. Will be back to [place] in Feb. Current on INR, apt scheduled. Reviewed warfarin dose.

## 2021-01-19 ENCOUNTER — ANTICOAGULATION VISIT (OUTPATIENT)
Dept: CARDIOLOGY | Facility: CLINIC | Age: 71
End: 2021-01-19

## 2021-01-19 DIAGNOSIS — Z95.3 HISTORY OF MITRAL VALVE REPLACEMENT WITH BIOPROSTHETIC VALVE: ICD-10-CM

## 2021-01-19 DIAGNOSIS — Z79.01 LONG TERM (CURRENT) USE OF ANTICOAGULANTS: ICD-10-CM

## 2021-01-19 DIAGNOSIS — I27.0 PULMONARY HYPERTENSION, PRIMARY (HCC): ICD-10-CM

## 2021-01-19 DIAGNOSIS — I48.0 PAROXYSMAL ATRIAL FIBRILLATION (HCC): ICD-10-CM

## 2021-01-19 LAB — INR PPP: 2.5

## 2021-02-09 DIAGNOSIS — I48.0 PAROXYSMAL ATRIAL FIBRILLATION (HCC): ICD-10-CM

## 2021-02-09 RX ORDER — METOPROLOL SUCCINATE 25 MG/1
TABLET, EXTENDED RELEASE ORAL
Qty: 90 TABLET | Refills: 1 | Status: SHIPPED | OUTPATIENT
Start: 2021-02-09

## 2021-02-09 RX ORDER — METOPROLOL SUCCINATE 25 MG/1
TABLET, EXTENDED RELEASE ORAL
Qty: 90 TABLET | Refills: 0 | OUTPATIENT
Start: 2021-02-09

## 2021-02-09 NOTE — TELEPHONE ENCOUNTER
METOPROLOL RX WAS TRASHED BY ACCIDENT,NEEDS A REFILL IF POSSIBLE [address]  PLEASE ADVISE HER

## 2021-02-25 ENCOUNTER — ANTICOAGULATION VISIT (OUTPATIENT)
Dept: CARDIOLOGY | Facility: CLINIC | Age: 71
End: 2021-02-25

## 2021-02-25 VITALS
HEART RATE: 72 BPM | SYSTOLIC BLOOD PRESSURE: 123 MMHG | WEIGHT: 166 LBS | DIASTOLIC BLOOD PRESSURE: 61 MMHG | TEMPERATURE: 96.4 F | BODY MASS INDEX: 29.41 KG/M2

## 2021-02-25 DIAGNOSIS — Z95.3 HISTORY OF MITRAL VALVE REPLACEMENT WITH BIOPROSTHETIC VALVE: ICD-10-CM

## 2021-02-25 DIAGNOSIS — I27.0 PULMONARY HYPERTENSION, PRIMARY (HCC): ICD-10-CM

## 2021-02-25 DIAGNOSIS — I48.0 PAROXYSMAL ATRIAL FIBRILLATION (HCC): ICD-10-CM

## 2021-02-25 DIAGNOSIS — Z79.01 LONG TERM (CURRENT) USE OF ANTICOAGULANTS: ICD-10-CM

## 2021-02-25 LAB — INR PPP: 2.4 (ref 0.9–1.1)

## 2021-02-25 PROCEDURE — 36416 COLLJ CAPILLARY BLOOD SPEC: CPT | Performed by: INTERNAL MEDICINE

## 2021-02-25 PROCEDURE — 85610 PROTHROMBIN TIME: CPT | Performed by: INTERNAL MEDICINE

## 2021-02-26 PROBLEM — I10 BENIGN ESSENTIAL HYPERTENSION: Status: ACTIVE | Noted: 2021-02-26

## 2021-03-11 ENCOUNTER — OFFICE VISIT (OUTPATIENT)
Dept: CARDIOLOGY | Facility: CLINIC | Age: 71
End: 2021-03-11

## 2021-03-11 VITALS
OXYGEN SATURATION: 96 % | WEIGHT: 165 LBS | HEIGHT: 63 IN | BODY MASS INDEX: 29.23 KG/M2 | HEART RATE: 81 BPM | DIASTOLIC BLOOD PRESSURE: 70 MMHG | TEMPERATURE: 97.1 F | SYSTOLIC BLOOD PRESSURE: 111 MMHG

## 2021-03-11 DIAGNOSIS — E78.00 PURE HYPERCHOLESTEROLEMIA: ICD-10-CM

## 2021-03-11 DIAGNOSIS — Z98.890 S/P TRICUSPID VALVE REPAIR: ICD-10-CM

## 2021-03-11 DIAGNOSIS — I48.0 PAROXYSMAL ATRIAL FIBRILLATION (HCC): ICD-10-CM

## 2021-03-11 DIAGNOSIS — I27.0 PULMONARY HYPERTENSION, PRIMARY (HCC): ICD-10-CM

## 2021-03-11 DIAGNOSIS — G47.33 OBSTRUCTIVE SLEEP APNEA SYNDROME: ICD-10-CM

## 2021-03-11 DIAGNOSIS — Z95.3 HISTORY OF MITRAL VALVE REPLACEMENT WITH BIOPROSTHETIC VALVE: Primary | ICD-10-CM

## 2021-03-11 PROCEDURE — 99214 OFFICE O/P EST MOD 30 MIN: CPT | Performed by: INTERNAL MEDICINE

## 2021-03-11 NOTE — PROGRESS NOTES
"    Subjective:     Encounter Date:03/11/2021      Patient ID: Aleena Benson is a 71 y.o. female.    Chief Complaint:  History of Present Illness 69-year-old white female with history of atrial fibrillation history of mitral valve disease status post mitral valve replacement hypertension hyperlipidemia pulmonary hypertension sleep apnea presents to my office for follow-up.  Patient is currently stable without any symptoms of chest pain but has shortness of breath with exertion.  No complains of any PND orthopnea.  No palpitation dizziness syncope she has some swelling of the feet.  She is taking her medicines regularly.  She does not smoke but is trying to ambulate very well.    The following portions of the patient's history were reviewed and updated as appropriate: allergies, current medications, past family history, past medical history, past social history, past surgical history and problem list.  Past Medical History:   Diagnosis Date   • Atrial fibrillation (CMS/HCC)    • Hyperlipidemia    • Hypertension    • Sleep apnea    • Valvular disease      Past Surgical History:   Procedure Laterality Date   • ABDOMINAL SURGERY  2016    gastric bypass   • ADENOIDECTOMY     • BLADDER SURGERY      bladder sling   • CARDIAC CATHETERIZATION  2013 2015, 2017   • CHOLECYSTECTOMY     • HYSTERECTOMY     • MITRAL VALVE REPLACEMENT  2013   • THYROID SURGERY     • TONSILLECTOMY     • TRICUSPID VALVE REPLACEMENT  2013     /70   Pulse 81   Temp 97.1 °F (36.2 °C)   Ht 160 cm (63\")   Wt 74.8 kg (165 lb)   SpO2 96%   BMI 29.23 kg/m²   Family History   Problem Relation Age of Onset   • Heart disease Father    • Heart disease Brother    • Heart disease Paternal Uncle        Current Outpatient Medications:   •  ALPRAZolam (XANAX) 0.5 MG tablet, Take 0.5 mg by mouth 2 (Two) Times a Day As Needed for Anxiety., Disp: , Rfl: 0  •  aspirin 81 MG tablet, Take 81 mg by mouth Daily., Disp: , Rfl:   •  bumetanide (BUMEX) 1 MG " tablet, Take 1 tablet by mouth Daily., Disp: 90 tablet, Rfl: 0  •  Calcium Carb-Cholecalciferol 500-400 MG-UNIT chewable tablet, Chew 1 tablet Daily., Disp: , Rfl:   •  cetirizine (ZYRTEC ALLERGY) 10 MG tablet, Take 10 mg by mouth Daily., Disp: , Rfl:   •  Cholecalciferol (VITAMIN D-3) 1000 units capsule, Take 1,000 Units by mouth Daily., Disp: , Rfl:   •  Denosumab (PROLIA SC), Inject 60 mg as directed Every 6 (Six) Months., Disp: , Rfl:   •  escitalopram (LEXAPRO) 20 MG tablet, Take 20 mg by mouth Daily., Disp: , Rfl:   •  levothyroxine (SYNTHROID) 137 MCG tablet, Take 137 mcg by mouth Daily., Disp: , Rfl:   •  Macitentan (OPSUMIT) 10 MG tablet, Take 10 mg by mouth Daily., Disp: , Rfl:   •  metoprolol succinate XL (TOPROL-XL) 25 MG 24 hr tablet, Take 1 tablet by mouth daily, Disp: 90 tablet, Rfl: 1  •  Multiple Vitamins-Minerals (CENTRUM SILVER) tablet, Take 1 tablet by mouth Daily., Disp: , Rfl:   •  omeprazole (priLOSEC) 40 MG capsule, Take 40 mg by mouth Daily., Disp: , Rfl:   •  rosuvastatin (CRESTOR) 5 MG tablet, Take 0.5 tablets by mouth Daily., Disp: 90 tablet, Rfl: 3  •  sildenafil (REVATIO) 20 MG tablet, Take 20 mg by mouth 3 (Three) Times a Day., Disp: , Rfl:   •  warfarin (COUMADIN) 3 MG tablet, Take 1 tablet by mouth Daily., Disp: 90 tablet, Rfl: 0  Allergies   Allergen Reactions   • Adhesive Tape Unknown (See Comments)   • Codeine Unknown (See Comments)   • Ibuprofen Unknown - High Severity     Social History     Socioeconomic History   • Marital status:      Spouse name: Not on file   • Number of children: Not on file   • Years of education: Not on file   • Highest education level: Not on file   Tobacco Use   • Smoking status: Never Smoker   • Smokeless tobacco: Never Used   Substance and Sexual Activity   • Alcohol use: Not Currently   • Drug use: Never   • Sexual activity: Defer     Review of Systems   Constitutional: Positive for malaise/fatigue. Negative for fever.   Cardiovascular:  Positive for leg swelling. Negative for chest pain, dyspnea on exertion and palpitations.   Respiratory: Positive for shortness of breath. Negative for cough.    Skin: Negative for rash.   Gastrointestinal: Negative for abdominal pain, nausea and vomiting.   Neurological: Negative for focal weakness, headaches, light-headedness and numbness.   All other systems reviewed and are negative.             Objective:     Constitutional:       Appearance: Well-developed.   Eyes:      General: No scleral icterus.     Conjunctiva/sclera: Conjunctivae normal.   HENT:      Head: Normocephalic and atraumatic.   Neck:      Vascular: No carotid bruit or JVD.   Pulmonary:      Effort: Pulmonary effort is normal.      Breath sounds: Normal breath sounds. No wheezing. No rales.   Cardiovascular:      Normal rate. Regular rhythm.   Pulses:     Intact distal pulses.   Abdominal:      General: Bowel sounds are normal.      Palpations: Abdomen is soft.   Musculoskeletal:      Cervical back: Normal range of motion and neck supple. Skin:     General: Skin is warm and dry.      Findings: No rash.   Neurological:      Mental Status: Alert.       Procedures    Lab Review:         MDM   1 history of mitral valve disorder  Patient had mitral valve replacement with a bioprosthetic valve  valve and is currently stable on medications  2.  History of atrial fibrillation  Patient is currently stable on medical therapy including warfarin  3 hypertension  Patient blood pressure currently stable on medications continue metoprolol  4.  Hyperlipidemia  Patient's lipid levels are followed by the primary doctor and is on Crestor  5.  Obstructive sleep apnea and pulmonary hypertension  Patient is followed by pulmonologist

## 2021-04-01 ENCOUNTER — ANTICOAGULATION VISIT (OUTPATIENT)
Dept: CARDIOLOGY | Facility: CLINIC | Age: 71
End: 2021-04-01

## 2021-04-01 VITALS
WEIGHT: 165 LBS | DIASTOLIC BLOOD PRESSURE: 64 MMHG | TEMPERATURE: 96.6 F | BODY MASS INDEX: 29.23 KG/M2 | HEART RATE: 75 BPM | SYSTOLIC BLOOD PRESSURE: 131 MMHG

## 2021-04-01 DIAGNOSIS — I27.0 PULMONARY HYPERTENSION, PRIMARY (HCC): ICD-10-CM

## 2021-04-01 DIAGNOSIS — Z79.01 LONG TERM (CURRENT) USE OF ANTICOAGULANTS: ICD-10-CM

## 2021-04-01 DIAGNOSIS — I48.0 PAROXYSMAL ATRIAL FIBRILLATION (HCC): ICD-10-CM

## 2021-04-01 DIAGNOSIS — Z95.3 HISTORY OF MITRAL VALVE REPLACEMENT WITH BIOPROSTHETIC VALVE: ICD-10-CM

## 2021-04-01 LAB — INR PPP: 2.2 (ref 0.9–1.1)

## 2021-04-01 PROCEDURE — 85610 PROTHROMBIN TIME: CPT | Performed by: INTERNAL MEDICINE

## 2021-04-01 PROCEDURE — 36416 COLLJ CAPILLARY BLOOD SPEC: CPT | Performed by: INTERNAL MEDICINE

## 2021-04-07 DIAGNOSIS — Z79.01 LONG TERM (CURRENT) USE OF ANTICOAGULANTS: ICD-10-CM

## 2021-04-07 DIAGNOSIS — Z95.3 HISTORY OF MITRAL VALVE REPLACEMENT WITH BIOPROSTHETIC VALVE: ICD-10-CM

## 2021-04-07 DIAGNOSIS — I48.0 PAROXYSMAL ATRIAL FIBRILLATION (HCC): ICD-10-CM

## 2021-04-07 RX ORDER — WARFARIN SODIUM 3 MG/1
3 TABLET ORAL DAILY
Qty: 90 TABLET | Refills: 0 | Status: SHIPPED | OUTPATIENT
Start: 2021-04-07

## 2021-05-10 ENCOUNTER — ANTICOAGULATION VISIT (OUTPATIENT)
Dept: CARDIOLOGY | Facility: CLINIC | Age: 71
End: 2021-05-10

## 2021-05-10 VITALS
HEART RATE: 66 BPM | WEIGHT: 164 LBS | BODY MASS INDEX: 29.05 KG/M2 | DIASTOLIC BLOOD PRESSURE: 60 MMHG | SYSTOLIC BLOOD PRESSURE: 143 MMHG

## 2021-05-10 DIAGNOSIS — Z79.01 LONG TERM (CURRENT) USE OF ANTICOAGULANTS: ICD-10-CM

## 2021-05-10 DIAGNOSIS — I27.0 PULMONARY HYPERTENSION, PRIMARY (HCC): ICD-10-CM

## 2021-05-10 DIAGNOSIS — Z95.3 HISTORY OF MITRAL VALVE REPLACEMENT WITH BIOPROSTHETIC VALVE: Primary | ICD-10-CM

## 2021-05-10 LAB — INR PPP: 2.4 (ref 0.9–1.1)

## 2021-05-10 PROCEDURE — 85610 PROTHROMBIN TIME: CPT | Performed by: INTERNAL MEDICINE

## 2021-05-10 PROCEDURE — 36416 COLLJ CAPILLARY BLOOD SPEC: CPT | Performed by: INTERNAL MEDICINE

## 2021-06-14 ENCOUNTER — ANTICOAGULATION VISIT (OUTPATIENT)
Dept: CARDIOLOGY | Facility: CLINIC | Age: 71
End: 2021-06-14

## 2021-06-14 ENCOUNTER — TELEPHONE (OUTPATIENT)
Dept: CARDIOLOGY | Facility: CLINIC | Age: 71
End: 2021-06-14

## 2021-06-14 VITALS
SYSTOLIC BLOOD PRESSURE: 127 MMHG | HEART RATE: 82 BPM | BODY MASS INDEX: 29.23 KG/M2 | DIASTOLIC BLOOD PRESSURE: 66 MMHG | WEIGHT: 165 LBS

## 2021-06-14 DIAGNOSIS — Z95.3 HISTORY OF MITRAL VALVE REPLACEMENT WITH BIOPROSTHETIC VALVE: Primary | ICD-10-CM

## 2021-06-14 DIAGNOSIS — I27.0 PULMONARY HYPERTENSION, PRIMARY (HCC): ICD-10-CM

## 2021-06-14 DIAGNOSIS — Z79.01 LONG TERM (CURRENT) USE OF ANTICOAGULANTS: ICD-10-CM

## 2021-06-14 LAB — INR PPP: 3 (ref 0.9–1.1)

## 2021-06-14 PROCEDURE — 85610 PROTHROMBIN TIME: CPT | Performed by: INTERNAL MEDICINE

## 2021-06-14 PROCEDURE — 36416 COLLJ CAPILLARY BLOOD SPEC: CPT | Performed by: INTERNAL MEDICINE

## 2021-06-14 NOTE — TELEPHONE ENCOUNTER
Patient switched to Dr. Weiner Cardiologist. Advised patient we will no longer manage patient's warfarin. Patient needs to have Dr. Weiner prescribe and manage warfarin. Patient verbalizes understanding Theo

## 2021-08-23 ENCOUNTER — TRANSCRIBE ORDERS (OUTPATIENT)
Dept: CARDIAC REHAB | Facility: HOSPITAL | Age: 71
End: 2021-08-23

## 2021-08-23 DIAGNOSIS — Z95.2 S/P TAVR (TRANSCATHETER AORTIC VALVE REPLACEMENT): Primary | ICD-10-CM

## 2021-08-31 ENCOUNTER — APPOINTMENT (OUTPATIENT)
Dept: CARDIAC REHAB | Facility: HOSPITAL | Age: 71
End: 2021-08-31

## 2021-09-01 ENCOUNTER — OFFICE VISIT (OUTPATIENT)
Dept: CARDIAC REHAB | Facility: HOSPITAL | Age: 71
End: 2021-09-01

## 2021-09-01 DIAGNOSIS — Z95.2 S/P TAVR (TRANSCATHETER AORTIC VALVE REPLACEMENT): Primary | ICD-10-CM

## 2021-09-01 PROCEDURE — 93798 PHYS/QHP OP CAR RHAB W/ECG: CPT

## 2021-09-02 ENCOUNTER — TREATMENT (OUTPATIENT)
Dept: CARDIAC REHAB | Facility: HOSPITAL | Age: 71
End: 2021-09-02

## 2021-09-02 DIAGNOSIS — Z95.2 S/P TAVR (TRANSCATHETER AORTIC VALVE REPLACEMENT): Primary | ICD-10-CM

## 2021-09-02 PROCEDURE — 93798 PHYS/QHP OP CAR RHAB W/ECG: CPT

## 2021-09-09 ENCOUNTER — APPOINTMENT (OUTPATIENT)
Dept: CARDIAC REHAB | Facility: HOSPITAL | Age: 71
End: 2021-09-09

## 2021-09-09 ENCOUNTER — TREATMENT (OUTPATIENT)
Dept: CARDIAC REHAB | Facility: HOSPITAL | Age: 71
End: 2021-09-09

## 2021-09-09 DIAGNOSIS — Z95.2 S/P TAVR (TRANSCATHETER AORTIC VALVE REPLACEMENT): Primary | ICD-10-CM

## 2021-09-09 PROCEDURE — 93798 PHYS/QHP OP CAR RHAB W/ECG: CPT

## 2021-09-13 ENCOUNTER — APPOINTMENT (OUTPATIENT)
Dept: CARDIAC REHAB | Facility: HOSPITAL | Age: 71
End: 2021-09-13

## 2021-09-13 ENCOUNTER — TREATMENT (OUTPATIENT)
Dept: CARDIAC REHAB | Facility: HOSPITAL | Age: 71
End: 2021-09-13

## 2021-09-13 DIAGNOSIS — Z95.2 S/P TAVR (TRANSCATHETER AORTIC VALVE REPLACEMENT): Primary | ICD-10-CM

## 2021-09-13 PROCEDURE — 93798 PHYS/QHP OP CAR RHAB W/ECG: CPT

## 2021-09-16 ENCOUNTER — APPOINTMENT (OUTPATIENT)
Dept: CARDIAC REHAB | Facility: HOSPITAL | Age: 71
End: 2021-09-16

## 2021-09-20 ENCOUNTER — APPOINTMENT (OUTPATIENT)
Dept: CARDIAC REHAB | Facility: HOSPITAL | Age: 71
End: 2021-09-20

## 2021-09-23 ENCOUNTER — APPOINTMENT (OUTPATIENT)
Dept: CARDIAC REHAB | Facility: HOSPITAL | Age: 71
End: 2021-09-23

## 2021-09-27 ENCOUNTER — APPOINTMENT (OUTPATIENT)
Dept: CARDIAC REHAB | Facility: HOSPITAL | Age: 71
End: 2021-09-27

## 2021-09-30 ENCOUNTER — APPOINTMENT (OUTPATIENT)
Dept: CARDIAC REHAB | Facility: HOSPITAL | Age: 71
End: 2021-09-30

## 2021-10-04 ENCOUNTER — APPOINTMENT (OUTPATIENT)
Dept: CARDIAC REHAB | Facility: HOSPITAL | Age: 71
End: 2021-10-04

## 2021-10-07 ENCOUNTER — APPOINTMENT (OUTPATIENT)
Dept: CARDIAC REHAB | Facility: HOSPITAL | Age: 71
End: 2021-10-07

## 2021-10-11 ENCOUNTER — APPOINTMENT (OUTPATIENT)
Dept: CARDIAC REHAB | Facility: HOSPITAL | Age: 71
End: 2021-10-11

## 2021-10-14 ENCOUNTER — APPOINTMENT (OUTPATIENT)
Dept: CARDIAC REHAB | Facility: HOSPITAL | Age: 71
End: 2021-10-14

## 2021-10-18 ENCOUNTER — APPOINTMENT (OUTPATIENT)
Dept: CARDIAC REHAB | Facility: HOSPITAL | Age: 71
End: 2021-10-18

## 2021-10-21 ENCOUNTER — APPOINTMENT (OUTPATIENT)
Dept: CARDIAC REHAB | Facility: HOSPITAL | Age: 71
End: 2021-10-21

## 2021-10-25 ENCOUNTER — APPOINTMENT (OUTPATIENT)
Dept: CARDIAC REHAB | Facility: HOSPITAL | Age: 71
End: 2021-10-25

## 2021-10-28 ENCOUNTER — APPOINTMENT (OUTPATIENT)
Dept: CARDIAC REHAB | Facility: HOSPITAL | Age: 71
End: 2021-10-28

## 2021-11-01 ENCOUNTER — APPOINTMENT (OUTPATIENT)
Dept: CARDIAC REHAB | Facility: HOSPITAL | Age: 71
End: 2021-11-01

## 2021-11-04 ENCOUNTER — APPOINTMENT (OUTPATIENT)
Dept: CARDIAC REHAB | Facility: HOSPITAL | Age: 71
End: 2021-11-04

## 2021-11-08 ENCOUNTER — APPOINTMENT (OUTPATIENT)
Dept: CARDIAC REHAB | Facility: HOSPITAL | Age: 71
End: 2021-11-08

## 2021-11-11 ENCOUNTER — APPOINTMENT (OUTPATIENT)
Dept: CARDIAC REHAB | Facility: HOSPITAL | Age: 71
End: 2021-11-11

## 2021-11-15 ENCOUNTER — APPOINTMENT (OUTPATIENT)
Dept: CARDIAC REHAB | Facility: HOSPITAL | Age: 71
End: 2021-11-15

## 2021-11-18 ENCOUNTER — APPOINTMENT (OUTPATIENT)
Dept: CARDIAC REHAB | Facility: HOSPITAL | Age: 71
End: 2021-11-18

## 2021-11-22 ENCOUNTER — APPOINTMENT (OUTPATIENT)
Dept: CARDIAC REHAB | Facility: HOSPITAL | Age: 71
End: 2021-11-22

## 2021-11-25 ENCOUNTER — APPOINTMENT (OUTPATIENT)
Dept: CARDIAC REHAB | Facility: HOSPITAL | Age: 71
End: 2021-11-25

## 2021-11-29 ENCOUNTER — APPOINTMENT (OUTPATIENT)
Dept: CARDIAC REHAB | Facility: HOSPITAL | Age: 71
End: 2021-11-29

## 2021-12-02 ENCOUNTER — APPOINTMENT (OUTPATIENT)
Dept: CARDIAC REHAB | Facility: HOSPITAL | Age: 71
End: 2021-12-02

## 2021-12-06 ENCOUNTER — APPOINTMENT (OUTPATIENT)
Dept: CARDIAC REHAB | Facility: HOSPITAL | Age: 71
End: 2021-12-06

## 2021-12-09 ENCOUNTER — APPOINTMENT (OUTPATIENT)
Dept: CARDIAC REHAB | Facility: HOSPITAL | Age: 71
End: 2021-12-09

## 2021-12-13 ENCOUNTER — APPOINTMENT (OUTPATIENT)
Dept: CARDIAC REHAB | Facility: HOSPITAL | Age: 71
End: 2021-12-13

## 2021-12-16 ENCOUNTER — APPOINTMENT (OUTPATIENT)
Dept: CARDIAC REHAB | Facility: HOSPITAL | Age: 71
End: 2021-12-16

## 2021-12-20 ENCOUNTER — APPOINTMENT (OUTPATIENT)
Dept: CARDIAC REHAB | Facility: HOSPITAL | Age: 71
End: 2021-12-20

## 2021-12-23 ENCOUNTER — APPOINTMENT (OUTPATIENT)
Dept: CARDIAC REHAB | Facility: HOSPITAL | Age: 71
End: 2021-12-23

## 2021-12-27 ENCOUNTER — APPOINTMENT (OUTPATIENT)
Dept: CARDIAC REHAB | Facility: HOSPITAL | Age: 71
End: 2021-12-27

## 2021-12-30 ENCOUNTER — APPOINTMENT (OUTPATIENT)
Dept: CARDIAC REHAB | Facility: HOSPITAL | Age: 71
End: 2021-12-30

## 2022-10-24 ENCOUNTER — HOSPITAL ENCOUNTER (OUTPATIENT)
Dept: CARDIOLOGY | Facility: HOSPITAL | Age: 72
Discharge: HOME | End: 2022-10-24

## 2022-10-24 ENCOUNTER — TRANSCRIBE ORDERS (OUTPATIENT)
Dept: ADMINISTRATIVE | Facility: HOSPITAL | Age: 72
End: 2022-10-24

## 2022-10-24 ENCOUNTER — LAB (OUTPATIENT)
Dept: LAB | Facility: HOSPITAL | Age: 72
End: 2022-10-24

## 2022-10-24 DIAGNOSIS — Z01.818 PREOP TESTING: ICD-10-CM

## 2022-10-24 DIAGNOSIS — Z01.818 PREOP TESTING: Primary | ICD-10-CM

## 2022-10-24 LAB
ANION GAP SERPL CALCULATED.3IONS-SCNC: 6.8 MMOL/L (ref 5–15)
BASOPHILS # BLD AUTO: 0.04 10*3/MM3 (ref 0–0.2)
BASOPHILS NFR BLD AUTO: 0.6 % (ref 0–1.5)
BUN SERPL-MCNC: 20 MG/DL (ref 8–23)
BUN/CREAT SERPL: 18.9 (ref 7–25)
CALCIUM SPEC-SCNC: 9.7 MG/DL (ref 8.6–10.5)
CHLORIDE SERPL-SCNC: 101 MMOL/L (ref 98–107)
CO2 SERPL-SCNC: 34.2 MMOL/L (ref 22–29)
CREAT SERPL-MCNC: 1.06 MG/DL (ref 0.57–1)
DEPRECATED RDW RBC AUTO: 41.8 FL (ref 37–54)
EGFRCR SERPLBLD CKD-EPI 2021: 55.6 ML/MIN/1.73
EOSINOPHIL # BLD AUTO: 0.49 10*3/MM3 (ref 0–0.4)
EOSINOPHIL NFR BLD AUTO: 7.6 % (ref 0.3–6.2)
ERYTHROCYTE [DISTWIDTH] IN BLOOD BY AUTOMATED COUNT: 13.6 % (ref 12.3–15.4)
GLUCOSE SERPL-MCNC: 106 MG/DL (ref 65–99)
HCT VFR BLD AUTO: 35 % (ref 34–46.6)
HGB BLD-MCNC: 11.3 G/DL (ref 12–15.9)
IMM GRANULOCYTES # BLD AUTO: 0.01 10*3/MM3 (ref 0–0.05)
IMM GRANULOCYTES NFR BLD AUTO: 0.2 % (ref 0–0.5)
LYMPHOCYTES # BLD AUTO: 2.27 10*3/MM3 (ref 0.7–3.1)
LYMPHOCYTES NFR BLD AUTO: 35.2 % (ref 19.6–45.3)
MCH RBC QN AUTO: 27.2 PG (ref 26.6–33)
MCHC RBC AUTO-ENTMCNC: 32.3 G/DL (ref 31.5–35.7)
MCV RBC AUTO: 84.1 FL (ref 79–97)
MONOCYTES # BLD AUTO: 0.94 10*3/MM3 (ref 0.1–0.9)
MONOCYTES NFR BLD AUTO: 14.6 % (ref 5–12)
NEUTROPHILS NFR BLD AUTO: 2.7 10*3/MM3 (ref 1.7–7)
NEUTROPHILS NFR BLD AUTO: 41.8 % (ref 42.7–76)
NRBC BLD AUTO-RTO: 0 /100 WBC (ref 0–0.2)
PLATELET # BLD AUTO: 246 10*3/MM3 (ref 140–450)
PMV BLD AUTO: 11.6 FL (ref 6–12)
POTASSIUM SERPL-SCNC: 5.2 MMOL/L (ref 3.5–5.2)
RBC # BLD AUTO: 4.16 10*6/MM3 (ref 3.77–5.28)
SODIUM SERPL-SCNC: 142 MMOL/L (ref 136–145)
WBC NRBC COR # BLD: 6.45 10*3/MM3 (ref 3.4–10.8)

## 2022-10-24 PROCEDURE — 93010 ELECTROCARDIOGRAM REPORT: CPT | Performed by: INTERNAL MEDICINE

## 2022-10-24 PROCEDURE — 36415 COLL VENOUS BLD VENIPUNCTURE: CPT

## 2022-10-24 PROCEDURE — 93005 ELECTROCARDIOGRAM TRACING: CPT | Performed by: OTOLARYNGOLOGY

## 2022-10-24 PROCEDURE — 85025 COMPLETE CBC W/AUTO DIFF WBC: CPT

## 2022-10-24 PROCEDURE — 80048 BASIC METABOLIC PNL TOTAL CA: CPT

## 2022-10-25 ENCOUNTER — TELEPHONE (OUTPATIENT)
Dept: CARDIOLOGY | Facility: CLINIC | Age: 72
End: 2022-10-25

## 2022-10-25 LAB — QT INTERVAL: 390 MS

## 2022-10-25 NOTE — TELEPHONE ENCOUNTER
[facility] CARDIAC CLEARANCE REQUEST FROM [facility] NOW SEES DR. TYRA CHAVIRA. CALLED JAYA [phone] AND LMOM AND ADVSD.

## 2022-12-15 ENCOUNTER — OFFICE (AMBULATORY)
Dept: URBAN - METROPOLITAN AREA CLINIC 64 | Facility: CLINIC | Age: 72
End: 2022-12-15

## 2022-12-15 VITALS
DIASTOLIC BLOOD PRESSURE: 73 MMHG | HEART RATE: 76 BPM | WEIGHT: 163 LBS | HEIGHT: 63 IN | SYSTOLIC BLOOD PRESSURE: 122 MMHG

## 2022-12-15 DIAGNOSIS — R11.10 VOMITING, UNSPECIFIED: ICD-10-CM

## 2022-12-15 DIAGNOSIS — R13.10 DYSPHAGIA, UNSPECIFIED: ICD-10-CM

## 2022-12-15 DIAGNOSIS — I27.20 PULMONARY HYPERTENSION, UNSPECIFIED: ICD-10-CM

## 2022-12-15 DIAGNOSIS — K21.9 GASTRO-ESOPHAGEAL REFLUX DISEASE WITHOUT ESOPHAGITIS: ICD-10-CM

## 2022-12-15 DIAGNOSIS — D64.9 ANEMIA, UNSPECIFIED: ICD-10-CM

## 2022-12-15 DIAGNOSIS — Z79.01 LONG TERM (CURRENT) USE OF ANTICOAGULANTS: ICD-10-CM

## 2022-12-15 PROCEDURE — 99214 OFFICE O/P EST MOD 30 MIN: CPT | Performed by: INTERNAL MEDICINE

## 2022-12-15 RX ORDER — SUCRALFATE 1 G/1
TABLET ORAL
Qty: 120 | Refills: 5 | Status: ACTIVE
Start: 2022-12-15

## 2023-01-05 ENCOUNTER — ON CAMPUS - OUTPATIENT (AMBULATORY)
Dept: URBAN - METROPOLITAN AREA HOSPITAL 2 | Facility: HOSPITAL | Age: 73
End: 2023-01-05

## 2023-01-05 VITALS
SYSTOLIC BLOOD PRESSURE: 108 MMHG | RESPIRATION RATE: 16 BRPM | HEART RATE: 82 BPM | RESPIRATION RATE: 19 BRPM | SYSTOLIC BLOOD PRESSURE: 132 MMHG | WEIGHT: 161 LBS | TEMPERATURE: 98.2 F | DIASTOLIC BLOOD PRESSURE: 113 MMHG | SYSTOLIC BLOOD PRESSURE: 94 MMHG | OXYGEN SATURATION: 96 % | OXYGEN SATURATION: 95 % | RESPIRATION RATE: 18 BRPM | HEART RATE: 79 BPM | OXYGEN SATURATION: 99 % | DIASTOLIC BLOOD PRESSURE: 74 MMHG | DIASTOLIC BLOOD PRESSURE: 66 MMHG | HEART RATE: 67 BPM | SYSTOLIC BLOOD PRESSURE: 185 MMHG | HEART RATE: 89 BPM | DIASTOLIC BLOOD PRESSURE: 75 MMHG | DIASTOLIC BLOOD PRESSURE: 78 MMHG | HEIGHT: 63 IN | SYSTOLIC BLOOD PRESSURE: 131 MMHG | RESPIRATION RATE: 15 BRPM | HEART RATE: 78 BPM

## 2023-01-05 DIAGNOSIS — K44.9 DIAPHRAGMATIC HERNIA WITHOUT OBSTRUCTION OR GANGRENE: ICD-10-CM

## 2023-01-05 DIAGNOSIS — Z98.84 BARIATRIC SURGERY STATUS: ICD-10-CM

## 2023-01-05 DIAGNOSIS — D51.9 VITAMIN B12 DEFICIENCY ANEMIA, UNSPECIFIED: ICD-10-CM

## 2023-01-05 DIAGNOSIS — D50.9 IRON DEFICIENCY ANEMIA, UNSPECIFIED: ICD-10-CM

## 2023-01-05 DIAGNOSIS — R13.10 DYSPHAGIA, UNSPECIFIED: ICD-10-CM

## 2023-01-05 PROBLEM — Z48.815 ENCOUNTER FOR SURGICAL AFTERCARE FOLLOWING SURGERY ON THE DI: Status: ACTIVE | Noted: 2023-01-05

## 2023-01-05 PROCEDURE — 43249 ESOPH EGD DILATION <30 MM: CPT | Performed by: INTERNAL MEDICINE

## 2023-03-17 PROBLEM — D50.9 IRON DEFICIENCY ANEMIA: Status: ACTIVE | Noted: 2023-01-05
